# Patient Record
Sex: FEMALE
[De-identification: names, ages, dates, MRNs, and addresses within clinical notes are randomized per-mention and may not be internally consistent; named-entity substitution may affect disease eponyms.]

---

## 2017-10-30 ENCOUNTER — HOSPITAL (OUTPATIENT)
Dept: OTHER | Age: 32
End: 2017-10-30
Attending: EMERGENCY MEDICINE

## 2018-02-01 ENCOUNTER — OFFICE VISIT (OUTPATIENT)
Dept: FAMILY MEDICINE CLINIC | Facility: CLINIC | Age: 33
End: 2018-02-01

## 2018-02-01 VITALS
TEMPERATURE: 98 F | RESPIRATION RATE: 16 BRPM | BODY MASS INDEX: 29.99 KG/M2 | OXYGEN SATURATION: 99 % | SYSTOLIC BLOOD PRESSURE: 130 MMHG | HEIGHT: 65 IN | WEIGHT: 180 LBS | HEART RATE: 79 BPM | DIASTOLIC BLOOD PRESSURE: 72 MMHG

## 2018-02-01 DIAGNOSIS — L08.9 LOCAL SKIN INFECTION: Primary | ICD-10-CM

## 2018-02-01 PROCEDURE — 99202 OFFICE O/P NEW SF 15 MIN: CPT | Performed by: NURSE PRACTITIONER

## 2018-02-01 RX ORDER — CEPHALEXIN 500 MG/1
500 CAPSULE ORAL 2 TIMES DAILY
Qty: 14 CAPSULE | Refills: 0 | Status: SHIPPED | OUTPATIENT
Start: 2018-02-01 | End: 2018-04-26

## 2018-02-01 NOTE — PROGRESS NOTES
CHIEF COMPLAINT:   Patient presents with:  Skin: pt has a rash on left side of torso, c/o itching, has pus coming from area x 2 dys using neosporin      HPI:     Bradley Schultz is a 27year old female who presents with concerns of a skin infection un enlargement of the lymph nodes. MUSC/SKEL: no joint swelling, no joint stiffness. CARDIOVASCULAR: denies chest pain on exertion or rest.  GI: no nausea, no vomiting or abdominal pain  NEURO: no abnormal sensation, no tingling of the skin or numbness. ointment as prescribed 3 times daily  Apply band aid to area after mupirocin is applied   May shower, dry well and apply mupirocin / Band-Aid    Avoid picking or touching   If not improving in 3-5 days, follow-up with PCP.      If any fevers, red streaks, p

## 2018-02-01 NOTE — PATIENT INSTRUCTIONS
Cephalexin as prescribed   Mupirocin ointment as prescribed 3 times daily  Apply band aid to area after mupirocin is applied   May shower, dry well and apply mupirocin / Band-Aid    Avoid picking or touching   If not improving in 3-5 days, follow-up with [facility]

## 2018-04-26 ENCOUNTER — OFFICE VISIT (OUTPATIENT)
Dept: FAMILY MEDICINE CLINIC | Facility: CLINIC | Age: 33
End: 2018-04-26

## 2018-04-26 VITALS
OXYGEN SATURATION: 99 % | WEIGHT: 180 LBS | SYSTOLIC BLOOD PRESSURE: 112 MMHG | BODY MASS INDEX: 28.93 KG/M2 | HEIGHT: 66 IN | TEMPERATURE: 98 F | DIASTOLIC BLOOD PRESSURE: 74 MMHG | HEART RATE: 76 BPM

## 2018-04-26 DIAGNOSIS — L08.9 LOCAL SKIN INFECTION: ICD-10-CM

## 2018-04-26 PROCEDURE — 99213 OFFICE O/P EST LOW 20 MIN: CPT | Performed by: NURSE PRACTITIONER

## 2018-04-26 RX ORDER — CEPHALEXIN 500 MG/1
500 CAPSULE ORAL 2 TIMES DAILY
Qty: 14 CAPSULE | Refills: 0 | Status: SHIPPED | OUTPATIENT
Start: 2018-04-26 | End: 2018-05-03

## 2018-04-26 NOTE — PROGRESS NOTES
CHIEF COMPLAINT:   Patient presents with:  Skin: pt c/o irritation under left breast, c/o itching in area x 2 dys       HPI:     Trino Portillo is a 27year old female who presents with , Kathie Paz, with concerns of local skin infection to lef stiffness. CARDIOVASCULAR: denies chest pain on exertion or rest.  GI: no nausea, no vomiting or abdominal pain  NEURO: no abnormal sensation, no tingling of the skin or numbness.     EXAM:   /74   Pulse 76   Temp 98.2 °F (36.8 °C) (Oral)   Ht 66\"

## 2018-04-26 NOTE — PATIENT INSTRUCTIONS
Cephalexin as prescribed   Mupirocin ointment as prescribed 3 times daily  Apply band aid to area after mupirocin is applied   May shower, dry well and apply mupirocin / Band-Aid     Avoid picking or touching   If not improving in 3-5 days, follow-up with

## 2018-07-17 ENCOUNTER — HOSPITAL (OUTPATIENT)
Dept: OTHER | Age: 33
End: 2018-07-17
Attending: EMERGENCY MEDICINE

## 2018-07-23 ENCOUNTER — HOSPITAL (OUTPATIENT)
Dept: OTHER | Age: 33
End: 2018-07-23
Attending: EMERGENCY MEDICINE

## 2018-07-23 LAB
AMORPH SED URNS QL MICRO: ABNORMAL
APPEARANCE UR: CLEAR
BACTERIA #/AREA URNS HPF: ABNORMAL /HPF
BILIRUB UR QL: NEGATIVE
CAOX CRY URNS QL MICRO: ABNORMAL
CLUE CELLS SPEC QL WET PREP: NORMAL
COLOR UR: YELLOW
EPITH CASTS #/AREA URNS LPF: ABNORMAL /[LPF]
FATTY CASTS #/AREA URNS LPF: ABNORMAL /[LPF]
GLUCOSE UR-MCNC: NEGATIVE MG/DL
GRAN CASTS #/AREA URNS LPF: ABNORMAL /[LPF]
HGB UR QL: ABNORMAL
HYALINE CASTS #/AREA URNS LPF: ABNORMAL /LPF (ref 0–5)
KETONES UR-MCNC: NEGATIVE MG/DL
LEUKOCYTE ESTERASE UR QL STRIP: NEGATIVE
MIXED CELL CASTS #/AREA URNS LPF: ABNORMAL /[LPF]
MUCOUS THREADS URNS QL MICRO: PRESENT
NITRITE UR QL: NEGATIVE
PH UR: 6 UNIT (ref 5–7)
PROT UR QL: NEGATIVE MG/DL
RBC #/AREA URNS HPF: ABNORMAL /HPF (ref 0–3)
RBC CASTS #/AREA URNS LPF: ABNORMAL /[LPF]
RENAL EPI CELLS #/AREA URNS HPF: ABNORMAL /[HPF]
SP GR UR: 1.01 (ref 1–1.03)
SPECIMEN SOURCE: ABNORMAL
SPERM URNS QL MICRO: ABNORMAL
SQUAMOUS #/AREA URNS HPF: ABNORMAL /HPF (ref 0–5)
T VAGINALIS SPEC QL WET PREP: NORMAL
T VAGINALIS URNS QL MICRO: ABNORMAL
TRI-PHOS CRY URNS QL MICRO: ABNORMAL
URATE CRY URNS QL MICRO: ABNORMAL
URINE REFLEX: ABNORMAL
URNS CMNT MICRO: ABNORMAL
UROBILINOGEN UR QL: 0.2 MG/DL (ref 0–1)
WAXY CASTS #/AREA URNS LPF: ABNORMAL /[LPF]
WBC #/AREA URNS HPF: ABNORMAL /HPF (ref 0–5)
WBC CASTS #/AREA URNS LPF: ABNORMAL /[LPF]
YEAST HYPHAE URNS QL MICRO: ABNORMAL
YEAST SPEC QL WET PREP: NORMAL
YEAST URNS QL MICRO: ABNORMAL

## 2018-07-23 NOTE — ED INITIAL ASSESSMENT (HPI)
Patient to ED via EMS. Reports the patient has been agitated, screaming, and hitting her caregiver since around 4pm today.   Patient was seen earlier today at another ER, dx with a vaginal infection per caregiver

## 2018-07-24 NOTE — ED NOTES
Plan at this time to move pt to B2 with the possibility of needing to place restraints. RN remains at bedside with security and MIKI Silver, offering continued assistance to mother and pt. Pt continues to walk around room, refusing to sit on bed.

## 2018-07-24 NOTE — ED PROVIDER NOTES
Patient is resting comfortably. No issues as to this point my shift  She is medically stable for inpatient psychiatric treatment    The psychiatrist evaluated patient in the emergency department. He feels that patient may be safely discharged home.   He d

## 2018-07-24 NOTE — BH PROGRESS NOTE
This writer spoke with Dr. Keira Felder, who said he is aware that patient is still in the ED and he will see her today.

## 2018-07-24 NOTE — ED NOTES
7/24/18    (17:58 PM) Per [address] [place] [address]. Dr. Radha Pettit is dicharging Pt home. Notified ED Physician who was already aware of plan.

## 2018-07-24 NOTE — ED NOTES
Pt continues with agitation, staff remain at bedside. Mother continues to provide support to pt, despite pt continued attempts to strike at mother and bite self. Pt awake, skin w/d,resps reg/unlabored.

## 2018-07-24 NOTE — ED NOTES
Pt ambulated to room accompanied by Mom, nursing staff and security.  Pt continues to strike at her mother with open fist. Pt assisted onto cart by staff, plan to administer haldol per order and place restraints for pt, staff, and family safety at this time

## 2018-07-24 NOTE — ED NOTES
Pt noted to have some increase in moaning at this time. RN to room to check status, and pt noted to appear somewhat agitated, rocking on chair, mother providing support. Per mom, pt is wanting to eat her lunch.  [place] [facility] sandwich offered and pt and mother in

## 2018-07-24 NOTE — BH PROGRESS NOTE
[facility]: no beds  [place].  [facility]: no answer  [facility]: n obeds per [employer]: no beds  [facility]: KARE[facility] @ [address]  [facility] [address]: no beds per [employer]: Luis Carlos will return call

## 2018-07-24 NOTE — BH LEVEL OF CARE ASSESSMENT
Level of Care Assessment Note    General Questions  Why are you here?: Pt is diagnosed with Autism and mild mental retardation. Pt has hearing impairment in both ears and does not wear any hearing devices.  Pt is non verbal. Pt is unable to take part in the property, she attempted to break chairs by banging on them. Per pt's garegiver, pt does not understand the concept of suicidality. Pt has never attempted suicide   History of Present Illness: Pt is dxed with Autism with mild mental retardation.  Pt is non v concept of suicidality.  Pt has never attempted suicide   Family's Biggest Areas of Concern: Pt's aggressive behavior and agitation     Referral Source  Referral Source: Ruby 3: [facility]  Person/Contact Name: [facility]W ER     Suicide Risk  Sour SI)    Danger to Others/Property  Have you harmed someone or had thoughts about wanting someone harmed or killed in the past 30 days?: Yes (Pt is non verbal and unable to take part in the assessment.  Information is provide by pt's caregiver. )  Person(s) I caregiver     Self Injury  History of Self Injurious Behaviors: Yes  Date of Past Occurence: 07/20/18  Describe Past Self-Injurious Behaviors: Pt has hx of self injurious behavior, pt bite herself, scratch and picks her skin   Present Self-Injurious [employer] physical aggression and agitation )    Alcohol Use  How often do you have a drink containing alcohol? : Never       Illicit and Prescription Drug Use  Which if any illicit/prescription drugs have you used/abused?: Denies Volume: Loud;Yelling  Clarity: Other (comment) (Pt is non verbal)  Cognition  Concentration: Unimpaired  Memory: Unable to assess (Pt is non verbal and refusing to take part in assessment )  Orientation Level: Unable to assess (Pt is non verbal and currenl scratching, biting, hitting and biting staff members for the past one week. Pt has also engages in self injurous behavior, she picks at her skin, bites herself and scratches herself. Pt self in jured today by picking ehr skin.  Pt is destructive towards pro

## 2018-07-24 NOTE — BH PROGRESS NOTE
[facility]: no beds  [facility]: returned call, no beds  [facility]: per Kellie Matthews, no beds  a[facility]min at [facility]: sent page at [phone]

## 2018-07-24 NOTE — ED NOTES
Patient yelling and aggressive while in ER. Attempted to calm patient down, unable to calm down while in ER. Patient swinging and kicking at ER staff,   Haldol and Ativan given IM as ordered.   Patient remains in restraints, police and public safety remai

## 2018-07-24 NOTE — ED NOTES
No beds at [facility], [facility], [facility], [facility].

## 2018-07-24 NOTE — ED NOTES
Security at bedside, offering assistance to mother at this time. Mother refusing additional assistance, stating that she would like additional staff to step out of room at this time in order to talk with pt and attempt to calm her.  Staff remains outside of

## 2018-07-24 NOTE — BH PROGRESS NOTE
[address]: no answer  [address]: no bed per [facility]: [facility]: no beds  [facility]: [facility] [address] [place]: no bed per [facility]

## 2018-07-24 NOTE — ED NOTES
Pt banging on door. When open pt made sign to go \"home\" pt came out of room and walked around. Pt redirectable. Will call mom to see if family is coming.

## 2018-07-24 NOTE — ED NOTES
Spoke to Arturo Ortiz at [facility] will try to call a sitter to come to the ED to sit with pt.

## 2018-07-24 NOTE — ED NOTES
[facility]-unable to accomidate pt, [facility] beds, [place] ([place])-unable to accomidate pt

## 2018-07-24 NOTE — ED NOTES
Report received from [place], [facility]. Pt awake and alert to her norm, seated in chair, skin w/d,resps reg/unlabored. Pt smiling, interacting with mother and  at bedside. Pt coloring with crayons. Pt given water and lunch tray ordered at this time.  Pt c

## 2018-07-24 NOTE — ED NOTES
Concern at this time that oral medication will not calm pt's anxiety at this time. Concern for staff and mother safety at this time.  Dr. Kaylen Narayan made aware of pt's agitation and discussion had with João Godinez RN that pt will need to possibly go back into

## 2018-07-24 NOTE — ED NOTES
- LEFT [facility] FOR FOR [facility] AT [facility] REQUESTING C/B TO DISCUSS POTENTIAL BED AVAILABILITY [phone]. AWAITING CALL BACK.   - SPOKE [address] [phone]. FAXED INFO [phone].  AWAITING C/B TO DISCUSS BED AVAILABILIT

## 2018-07-24 NOTE — CONSULTS
Psychiatric Evaluation    Conception Almonte YOB: 1985   Age/Gender 26year old female MRN [id number]   Location [facility] Attending Sravani Collazo MD   Hosp Day # 0 PCP PHYSICIAN NONSTAFF     Date of Service: going on may have also affected patient's mood such as her father retiring, changes at the group home. Mom denies the patient has ever been suicidal that she has ever made any attempts.   She has not indicated to her that she is actively depressed nor ha difficulty attending to interview as evidenced by patient's ability to attend to our converstation  Memory:  unable to assess -as evidenced by patients ability to give a consistent history   Intellect: severely impaired  based on patients language ellie acute safety concerns and denied that she has ever made any suicidal statements, no intent or plans in the past.  Mom stated that if patient becomes behaviorally agitated at home that she will return to the emergency room.         Plan:    After thorough di

## 2018-07-24 NOTE — ED NOTES
[facility]-unable to accomidate pt,  [facility]-unable to accomidate pt, [facility]unable to [facility] pt, [facility]-no appropriate bed

## 2018-07-24 NOTE — ED NOTES
At this time, RN returned to room with turkey sandwich. Pt remains seated, but mom found holding pt's wrists. Mom states at this time, Sergio John is asking for the restraints\". Additional staff called to room for assistance.  Pt noted to be getting up in room,

## 2018-07-24 NOTE — ED PROVIDER NOTES
Patient Seen in: [facility] Emergency Department    History   Patient presents with:  Eval-P (psychiatric)    Stated Complaint: EVAL P    HPI    33-year-old with mild MR, seizure disorder, autism and chronic loss of hearing presents for evaluation o [07/23/18 1902]  Pulse: (!) 148 [07/23/18 1852]  Resp: (!) 40 [07/23/18 1852]  Temp: 98.7 °F (37.1 °C) [07/23/18 1902]  Temp src: Temporal [07/23/18 1902]  SpO2: 98 % [07/23/18 1852]  O2 Device: None (Room air) [07/23/18 1852]    Current:/75   Pulse patient's clinical status due to severe agitation.   The patient required my time at the bedside performing direct personal management, the absence of which would likely result in sudden, clinically significant or life threatening deterioration of  clinical

## 2018-07-24 NOTE — ED NOTES
Pt ambulated to and from bathroom with steady gait. Pt updated on plan. Pt redirectable and laying in bed. Breakfast tray ordered.

## 2018-07-24 NOTE — ED PROVIDER NOTES
The patient yelled out only one time during my shift wondering where her mother was. She was redirected by nursing staff and calm down right away. Otherwise she was resting comfortably.

## 2018-07-24 NOTE — ED NOTES
Pt assisted to restroom, pt changed into new gown in front and back. Pt tolerated po med, lights down and moved to B4 for pt safety and sleep. Pt caregiver to home. All pertinent information left here, including contact phone numbers. Awaiting placement.  A

## 2018-07-24 NOTE — ED NOTES
Pt more calm at this time. Pt is responsive to written and sign. Pt was assisted to use the commode, undressed into hospital gown and socks. Tolerated well. Pt returned to cart and given box lunch and Gatorade per request. Pt's caregiver at bedside.

## 2018-07-24 NOTE — ED NOTES
SPOKE WITH FILIBERTO AT [facility] [phone]- DUE TO \"EXCLUSIONARY\" CRITERIA, THEY ARE UNABLE TO TAKE PT AND HAVE DENIED BED REQUEST.

## 2018-07-24 NOTE — BH PROGRESS NOTE
No appropriate beds at: [facility], [facility], [address] [place] [address]. Left message with [facility].

## 2018-07-24 NOTE — ED NOTES
Pt awake and asking mom to eat lunch. Security called to remove restraints. Mother aware pt must not hit or bite or pt will have to be restrained.

## 2018-07-24 NOTE — ED NOTES
Claudeen Reiter from [facility] [place] unable to accomodate francheska GIBBS aware.

## 2018-07-24 NOTE — ED NOTES
Dr. Rabia Thompson at bedside. Parents remain at bedside. Dr. Rabia Thompson discussed with parents at this time the need to administer additional medication due to increased agitation at this time.

## 2018-07-24 NOTE — ED NOTES
Alyssa Weaver appropriate bed, [facility]-not able to accommodate pt

## 2018-07-24 NOTE — ED NOTES
L/M for [facility]    No beds at [facility] or [place], [address]

## 2018-07-24 NOTE — ED NOTES
Patient is remained stable throughout ER shift, patient's parents and  have arrived in the emergency room and patient is calm and cooperative. Currently awaiting placement to psychiatric facility.

## 2018-07-24 NOTE — ED NOTES
Spoke with [facility] to provide requested information. Clinical faxed to [facility] and [facility] for review.

## 2018-07-24 NOTE — ED NOTES
Dr. Ray Trejo was notified that the pt has not been placed and that he would need to see her in the ER. He would need to see her by 2am on 7/25/18. Voicemail was left at 1500 on 7/24/18.

## 2018-07-30 ENCOUNTER — HOSPITAL (OUTPATIENT)
Dept: OTHER | Age: 33
End: 2018-07-30
Attending: EMERGENCY MEDICINE

## 2018-07-30 LAB
ALBUMIN SERPL-MCNC: 3.8 GM/DL (ref 3.6–5.1)
ALBUMIN/GLOB SERPL: 1 {RATIO} (ref 1–2.4)
ALP SERPL-CCNC: 70 UNIT/L (ref 45–117)
ALT SERPL-CCNC: 22 UNIT/L
AMORPH SED URNS QL MICRO: ABNORMAL
AMPHETAMINES UR QL SCN>500 NG/ML: NEGATIVE
ANALYZER ANC (IANC): ABNORMAL
ANION GAP SERPL CALC-SCNC: 15 MMOL/L (ref 10–20)
APPEARANCE UR: CLEAR
AST SERPL-CCNC: 18 UNIT/L
BACTERIA #/AREA URNS HPF: ABNORMAL /HPF
BARBITURATES UR QL SCN>200 NG/ML: NEGATIVE
BASOPHILS # BLD: 0 THOUSAND/MCL (ref 0–0.3)
BASOPHILS NFR BLD: 0 %
BENZODIAZ UR QL SCN>200 NG/ML: NEGATIVE
BILIRUB SERPL-MCNC: 0.6 MG/DL (ref 0.2–1)
BILIRUB UR QL: NEGATIVE
BUN SERPL-MCNC: 7 MG/DL (ref 6–20)
BUN/CREAT SERPL: 11 (ref 7–25)
BZE UR QL SCN>150 NG/ML: NEGATIVE
CALCIUM SERPL-MCNC: 9.1 MG/DL (ref 8.4–10.2)
CANNABINOIDS UR QL SCN>50 NG/ML: NEGATIVE
CAOX CRY URNS QL MICRO: ABNORMAL
CHLORIDE: 105 MMOL/L (ref 98–107)
CO2 SERPL-SCNC: 22 MMOL/L (ref 21–32)
COLOR UR: YELLOW
CREAT SERPL-MCNC: 0.63 MG/DL (ref 0.51–0.95)
DIFFERENTIAL METHOD BLD: ABNORMAL
EOSINOPHIL # BLD: 0 THOUSAND/MCL (ref 0.1–0.5)
EOSINOPHIL NFR BLD: 0 %
EPITH CASTS #/AREA URNS LPF: ABNORMAL /[LPF]
ERYTHROCYTE [DISTWIDTH] IN BLOOD: 12.8 % (ref 11–15)
ETHANOL SERPL-MCNC: NORMAL MG/DL
FATTY CASTS #/AREA URNS LPF: ABNORMAL /[LPF]
GLOBULIN SER-MCNC: 3.8 GM/DL (ref 2–4)
GLUCOSE SERPL-MCNC: 106 MG/DL (ref 65–99)
GLUCOSE UR-MCNC: NEGATIVE MG/DL
GRAN CASTS #/AREA URNS LPF: ABNORMAL /[LPF]
HEMATOCRIT: 42.4 % (ref 36–46.5)
HGB BLD-MCNC: 15.3 GM/DL (ref 12–15.5)
HGB UR QL: ABNORMAL
HYALINE CASTS #/AREA URNS LPF: ABNORMAL /LPF (ref 0–5)
KETONES UR-MCNC: NEGATIVE MG/DL
LEUKOCYTE ESTERASE UR QL STRIP: ABNORMAL
LYMPHOCYTES # BLD: 1.3 THOUSAND/MCL (ref 1–4.8)
LYMPHOCYTES NFR BLD: 9 %
MCH RBC QN AUTO: 30.5 PG (ref 26–34)
MCHC RBC AUTO-ENTMCNC: 36.1 GM/DL (ref 32–36.5)
MCV RBC AUTO: 84.5 FL (ref 78–100)
MIXED CELL CASTS #/AREA URNS LPF: ABNORMAL /[LPF]
MONOCYTES # BLD: 0.8 THOUSAND/MCL (ref 0.3–0.9)
MONOCYTES NFR BLD: 5 %
MUCOUS THREADS URNS QL MICRO: ABNORMAL
NEUTROPHILS # BLD: 12.2 THOUSAND/MCL (ref 1.8–7.7)
NEUTROPHILS NFR BLD: 86 %
NEUTS SEG NFR BLD: ABNORMAL %
NITRITE UR QL: NEGATIVE
NRBC (NRBCRE): ABNORMAL
OPIATES UR QL SCN>300 NG/ML: NEGATIVE
PCP UR QL SCN>25 NG/ML: NEGATIVE
PH UR: 5 UNIT (ref 5–7)
PLATELET # BLD: 290 THOUSAND/MCL (ref 140–450)
POTASSIUM SERPL-SCNC: 3.7 MMOL/L (ref 3.4–5.1)
PROT SERPL-MCNC: 7.6 GM/DL (ref 6.4–8.2)
PROT UR QL: NEGATIVE MG/DL
RBC # BLD: 5.02 MILLION/MCL (ref 4–5.2)
RBC #/AREA URNS HPF: ABNORMAL /HPF (ref 0–3)
RBC CASTS #/AREA URNS LPF: ABNORMAL /[LPF]
RENAL EPI CELLS #/AREA URNS HPF: ABNORMAL /[HPF]
SODIUM SERPL-SCNC: 138 MMOL/L (ref 135–145)
SP GR UR: 1.02 (ref 1–1.03)
SPECIMEN SOURCE: ABNORMAL
SPERM URNS QL MICRO: ABNORMAL
SQUAMOUS #/AREA URNS HPF: ABNORMAL /HPF (ref 0–5)
T VAGINALIS URNS QL MICRO: ABNORMAL
TRI-PHOS CRY URNS QL MICRO: ABNORMAL
URATE CRY URNS QL MICRO: ABNORMAL
URINE REFLEX: ABNORMAL
URNS CMNT MICRO: ABNORMAL
UROBILINOGEN UR QL: 0.2 MG/DL (ref 0–1)
WAXY CASTS #/AREA URNS LPF: ABNORMAL /[LPF]
WBC # BLD: 14.3 THOUSAND/MCL (ref 4.2–11)
WBC #/AREA URNS HPF: ABNORMAL /HPF (ref 0–5)
WBC CASTS #/AREA URNS LPF: ABNORMAL /[LPF]
YEAST HYPHAE URNS QL MICRO: ABNORMAL
YEAST URNS QL MICRO: ABNORMAL

## 2018-08-01 ENCOUNTER — HOSPITAL (OUTPATIENT)
Dept: OTHER | Age: 33
End: 2018-08-01
Attending: PSYCHIATRY & NEUROLOGY

## 2018-08-02 LAB
CHOLEST SERPL-MCNC: 219 MG/DL
CHOLEST/HDLC SERPL: 5.8 {RATIO}
FREE T4: 1.2 NG/DL (ref 0.8–1.5)
GLYCOHEMOGLOBIN: 4.6 % (ref 4.5–5.6)
HDLC SERPL-MCNC: 38 MG/DL
LDLC SERPL CALC-MCNC: 153 MG/DL
NONHDLC SERPL-MCNC: 181 MG/DL
T3 SERPL-MCNC: 1.52 NG/ML (ref 0.6–1.81)
T4 SERPL-MCNC: 16.3 MCG/DL (ref 4.7–13.3)
TRIGLYCERIDE (TRIGP): 140 MG/DL
TSH SERPL-ACNC: 1.79 MCUNIT/ML (ref 0.35–5)

## 2018-08-09 ENCOUNTER — OFFICE VISIT (OUTPATIENT)
Dept: FAMILY MEDICINE CLINIC | Facility: CLINIC | Age: 33
End: 2018-08-09
Payer: MEDICAID

## 2018-08-09 VITALS
OXYGEN SATURATION: 100 % | SYSTOLIC BLOOD PRESSURE: 122 MMHG | WEIGHT: 180 LBS | HEART RATE: 85 BPM | DIASTOLIC BLOOD PRESSURE: 76 MMHG | TEMPERATURE: 98 F | BODY MASS INDEX: 28.93 KG/M2 | HEIGHT: 66 IN

## 2018-08-09 DIAGNOSIS — N30.01 ACUTE CYSTITIS WITH HEMATURIA: Primary | ICD-10-CM

## 2018-08-09 DIAGNOSIS — N94.9 VAGINAL DISCOMFORT: ICD-10-CM

## 2018-08-09 LAB
APPEARANCE: CLEAR
MULTISTIX LOT#: ABNORMAL NUMERIC
PH, URINE: 7 (ref 4.5–8)
SPECIFIC GRAVITY: <=1.005 (ref 1–1.03)
URINE-COLOR: YELLOW
UROBILINOGEN,SEMI-QN: 0.2 MG/DL (ref 0–1.9)

## 2018-08-09 PROCEDURE — 99213 OFFICE O/P EST LOW 20 MIN: CPT | Performed by: NURSE PRACTITIONER

## 2018-08-09 PROCEDURE — 87086 URINE CULTURE/COLONY COUNT: CPT | Performed by: NURSE PRACTITIONER

## 2018-08-09 PROCEDURE — 81003 URINALYSIS AUTO W/O SCOPE: CPT | Performed by: NURSE PRACTITIONER

## 2018-08-09 RX ORDER — FLUCONAZOLE 150 MG/1
TABLET ORAL
Qty: 3 TABLET | Refills: 0 | Status: SHIPPED | OUTPATIENT
Start: 2018-08-09

## 2018-08-09 RX ORDER — RISPERIDONE 2 MG/1
TABLET, FILM COATED ORAL
Refills: 0 | COMMUNITY
Start: 2018-08-07 | End: 2018-08-09 | Stop reason: ALTCHOICE

## 2018-08-09 RX ORDER — CIPROFLOXACIN 500 MG/1
500 TABLET, FILM COATED ORAL 2 TIMES DAILY
Qty: 14 TABLET | Refills: 0 | Status: SHIPPED | OUTPATIENT
Start: 2018-08-09 | End: 2018-08-16

## 2018-08-09 RX ORDER — CARBAMAZEPINE 200 MG/1
TABLET ORAL
Refills: 0 | COMMUNITY
Start: 2018-08-07 | End: 2021-04-27

## 2018-08-09 NOTE — PATIENT INSTRUCTIONS
Fluids   Urine culture sent out   Take diflucan day one and repeat in 72 hours  If vaginal/urine symptoms persist or worsen please follow up with PCP

## 2018-08-09 NOTE — PROGRESS NOTES
CHIEF COMPLAINT:   Patient presents with:  Urinary: dysuria x 1 dy had recent yeast infection and took antibiotics, was discharged from [facility] on Tuesday and had change of meds per       HPI:   Sotero Elliott is a 26year old female who p mental retardation    • Seizure disorder [facility])       Social History:  Smoking status: Never Smoker                                                              Smokeless tobacco: Never Used                            REVIEW OF SYSTEMS:   GENERAL: [place] fe antibiotic and diflucan     PLAN: Meds as listed below. Meds & Refills for this Visit:    Signed Prescriptions Disp Refills    Ciprofloxacin HCl 500 MG Oral Tab 14 tablet 0      Sig: Take 1 tablet (500 mg total) by mouth 2 (two) times daily.       fluc

## 2018-08-14 ENCOUNTER — TELEPHONE (OUTPATIENT)
Dept: FAMILY MEDICINE CLINIC | Facility: CLINIC | Age: 33
End: 2018-08-14

## 2018-08-14 NOTE — TELEPHONE ENCOUNTER
Patient's  called from group home for urine culture results. Results were negative. She will bring guardianship papers later today so she can have urine culture results in her chart at the group home.

## 2019-01-03 ENCOUNTER — HOSPITAL (OUTPATIENT)
Dept: OTHER | Age: 34
End: 2019-01-03
Attending: EMERGENCY MEDICINE

## 2019-02-23 ENCOUNTER — HOSPITAL (OUTPATIENT)
Dept: OTHER | Age: 34
End: 2019-02-23
Attending: EMERGENCY MEDICINE

## 2020-04-23 ENCOUNTER — ADVANCED DIRECTIVES (OUTPATIENT)
Dept: HEALTH INFORMATION MANAGEMENT | Age: 35
End: 2020-04-23

## 2020-06-13 NOTE — ED NOTES
Mother called [facility] to have discharge instructions faxed to ED for Dr. Roddy Dance to review. Mom states pt was seen with [facility] yesterday and was dx with vaginitis.  Per Dr. Fanta Golden, who is currently present okay to order difulcan 200 mg tab 57

## 2020-09-22 ENCOUNTER — LAB ENCOUNTER (OUTPATIENT)
Dept: LAB | Facility: HOSPITAL | Age: 35
End: 2020-09-22
Attending: Other
Payer: MEDICARE

## 2020-09-22 ENCOUNTER — OFFICE VISIT (OUTPATIENT)
Dept: NEUROLOGY | Facility: CLINIC | Age: 35
End: 2020-09-22
Payer: MEDICARE

## 2020-09-22 VITALS
HEART RATE: 70 BPM | WEIGHT: 165 LBS | RESPIRATION RATE: 16 BRPM | DIASTOLIC BLOOD PRESSURE: 70 MMHG | BODY MASS INDEX: 27 KG/M2 | SYSTOLIC BLOOD PRESSURE: 126 MMHG

## 2020-09-22 DIAGNOSIS — F69 BEHAVIOR PROBLEM, ADULT: ICD-10-CM

## 2020-09-22 DIAGNOSIS — G40.909 SEIZURE DISORDER (HCC): ICD-10-CM

## 2020-09-22 DIAGNOSIS — H91.93 HEARING DEFICIT, BILATERAL: ICD-10-CM

## 2020-09-22 DIAGNOSIS — G40.909 SEIZURE DISORDER (HCC): Primary | ICD-10-CM

## 2020-09-22 DIAGNOSIS — F84.0 AUTISM: ICD-10-CM

## 2020-09-22 DIAGNOSIS — G40.909 RECURRENT SEIZURES (HCC): ICD-10-CM

## 2020-09-22 LAB
ALBUMIN SERPL-MCNC: 3.8 G/DL (ref 3.4–5)
ALBUMIN/GLOB SERPL: 1.1 {RATIO} (ref 1–2)
ALP LIVER SERPL-CCNC: 74 U/L
ALT SERPL-CCNC: 22 U/L
ANION GAP SERPL CALC-SCNC: 3 MMOL/L (ref 0–18)
AST SERPL-CCNC: 9 U/L (ref 15–37)
BASOPHILS # BLD AUTO: 0.03 X10(3) UL (ref 0–0.2)
BASOPHILS NFR BLD AUTO: 0.4 %
BILIRUB SERPL-MCNC: 0.3 MG/DL (ref 0.1–2)
BUN BLD-MCNC: 6 MG/DL (ref 7–18)
BUN/CREAT SERPL: 8.8 (ref 10–20)
CALCIUM BLD-MCNC: 8.8 MG/DL (ref 8.5–10.1)
CARBAMAZEPINE SERPL-MCNC: 7.2 UG/ML (ref 4–12)
CHLORIDE SERPL-SCNC: 107 MMOL/L (ref 98–112)
CO2 SERPL-SCNC: 28 MMOL/L (ref 21–32)
CREAT BLD-MCNC: 0.68 MG/DL
DEPRECATED RDW RBC AUTO: 37.2 FL (ref 35.1–46.3)
EOSINOPHIL # BLD AUTO: 0.02 X10(3) UL (ref 0–0.7)
EOSINOPHIL NFR BLD AUTO: 0.3 %
ERYTHROCYTE [DISTWIDTH] IN BLOOD BY AUTOMATED COUNT: 12 % (ref 11–15)
GLOBULIN PLAS-MCNC: 3.5 G/DL (ref 2.8–4.4)
GLUCOSE BLD-MCNC: 89 MG/DL (ref 70–99)
HCT VFR BLD AUTO: 40.8 %
HGB BLD-MCNC: 14.2 G/DL
IMM GRANULOCYTES # BLD AUTO: 0.03 X10(3) UL (ref 0–1)
IMM GRANULOCYTES NFR BLD: 0.4 %
LYMPHOCYTES # BLD AUTO: 1.52 X10(3) UL (ref 1–4)
LYMPHOCYTES NFR BLD AUTO: 20.4 %
M PROTEIN MFR SERPL ELPH: 7.3 G/DL (ref 6.4–8.2)
MCH RBC QN AUTO: 29.7 PG (ref 26–34)
MCHC RBC AUTO-ENTMCNC: 34.8 G/DL (ref 31–37)
MCV RBC AUTO: 85.4 FL
MONOCYTES # BLD AUTO: 0.71 X10(3) UL (ref 0.1–1)
MONOCYTES NFR BLD AUTO: 9.5 %
NEUTROPHILS # BLD AUTO: 5.13 X10 (3) UL (ref 1.5–7.7)
NEUTROPHILS # BLD AUTO: 5.13 X10(3) UL (ref 1.5–7.7)
NEUTROPHILS NFR BLD AUTO: 69 %
OSMOLALITY SERPL CALC.SUM OF ELEC: 283 MOSM/KG (ref 275–295)
PATIENT FASTING Y/N/NP: NO
PLATELET # BLD AUTO: 266 10(3)UL (ref 150–450)
POTASSIUM SERPL-SCNC: 3.7 MMOL/L (ref 3.5–5.1)
RBC # BLD AUTO: 4.78 X10(6)UL
SODIUM SERPL-SCNC: 138 MMOL/L (ref 136–145)
WBC # BLD AUTO: 7.4 X10(3) UL (ref 4–11)

## 2020-09-22 PROCEDURE — 80053 COMPREHEN METABOLIC PANEL: CPT

## 2020-09-22 PROCEDURE — 85025 COMPLETE CBC W/AUTO DIFF WBC: CPT

## 2020-09-22 PROCEDURE — 36415 COLL VENOUS BLD VENIPUNCTURE: CPT

## 2020-09-22 PROCEDURE — 80156 ASSAY CARBAMAZEPINE TOTAL: CPT

## 2020-09-22 PROCEDURE — 99204 OFFICE O/P NEW MOD 45 MIN: CPT | Performed by: OTHER

## 2020-09-22 RX ORDER — MELATONIN
1000 DAILY
COMMUNITY

## 2020-09-22 RX ORDER — ERGOCALCIFEROL 1.25 MG/1
CAPSULE ORAL
COMMUNITY

## 2020-09-22 RX ORDER — IBUPROFEN 400 MG/1
400 TABLET ORAL AS NEEDED
COMMUNITY

## 2020-09-22 NOTE — PROGRESS NOTES
Patient had a seizure on 09/01/2020. Patient had dizziness on this day. Patient now denies headaches or dizziness. Denies vision changes or facial numbness or tingling. Patient is grinding her teeth more.

## 2020-09-22 NOTE — PROGRESS NOTES
[facility] [address]   Neurology; INITIAL CLINIC VISIT  2020, 2:10 PM     Gloria Philippe Patient Status:  No patient class for patient encounter    1985 MRN [id number]   Location ED [facility], [facility]  use.    ALLERGIES:  No Known Allergies    MEDICATIONS:  Current Outpatient Medications   Medication Sig Dispense Refill   • Vitamin B-12 1000 MCG Oral Tab Take 1,000 mcg by mouth daily.      • ergocalciferol 1.25 MG (86613 UT) Oral Cap Take by mouth every 7 denies bruising or excessive bleeding  ENDOCRINE: denies excessive thirst or urination; denies unexpected wt gain or wt loss  ALLERGY/IMM.: denies food or seasonal allergies      PHYSICAL EXAMINATION:  VITAL SIGNS: /70   Pulse 70   Resp 16   Wt 165 l bilateral    Recurrent seizures (HCC)    Seizure disorder (HCC) - Primary    Relevant Orders    EEG    CARBAMAZEPINE (TEGRETOL) TOTAL (Completed)    CBC WITH DIFFERENTIAL WITH PLATELET (Completed)    COMP METABOLIC PANEL (14) (Completed)          [employer]

## 2020-09-25 ENCOUNTER — TELEPHONE (OUTPATIENT)
Dept: NEUROLOGY | Facility: CLINIC | Age: 35
End: 2020-09-25

## 2020-09-25 NOTE — TELEPHONE ENCOUNTER
Attempted to reach patient to relay lab results but no answer and no VM.       ----- Message from Alan Sandoval MD sent at 9/23/2020  1:23 PM CDT -----  Labs normal

## 2020-09-29 NOTE — TELEPHONE ENCOUNTER
LM on confidential VM for Daxa Kraus at [address] [place] [address] indicating normal lab results.

## 2020-10-07 ENCOUNTER — NURSE ONLY (OUTPATIENT)
Dept: ELECTROPHYSIOLOGY | Facility: HOSPITAL | Age: 35
End: 2020-10-07
Attending: Other
Payer: MEDICARE

## 2020-10-07 DIAGNOSIS — G40.909 SEIZURE DISORDER (HCC): ICD-10-CM

## 2020-10-07 PROCEDURE — 95819 EEG AWAKE AND ASLEEP: CPT | Performed by: OTHER

## 2020-10-08 ENCOUNTER — TELEPHONE (OUTPATIENT)
Dept: NEUROLOGY | Facility: CLINIC | Age: 35
End: 2020-10-08

## 2020-10-08 NOTE — TELEPHONE ENCOUNTER
Relayed below information to Juanita De Luna RN (Okay per HIPAA). She verbalized understanding.        ----- Message from Warner Land MD sent at 10/8/2020  8:29 AM CDT -----  EEG did not show active seizure, limited due to artifact.

## 2020-10-08 NOTE — PROCEDURES
[facility] [address]  [facility] [address] [address]      PATIENT'S NAME: Guilherme Segovia   ATTENDING PHYSICIAN: Ty Caldwell M.D.    PATIENT ACCOUNT #: [de-identified] LOCATION: [facility]   MEDICAL RECORD #: [id number] DATE OF BIRTH: [birth year] correlation is indicated.     Dictated By Nunu Grullon M.D.  d: 10/07/2020 14:39:20  t: 10/07/2020 15:33:42  [facility] [id number]  NADIA/

## 2021-04-09 ENCOUNTER — TELEPHONE (OUTPATIENT)
Dept: NEUROLOGY | Facility: CLINIC | Age: 36
End: 2021-04-09

## 2021-04-09 NOTE — TELEPHONE ENCOUNTER
rec'd lab results from [facility] dated 4/8/21; placed in provider folder for review

## 2021-04-27 ENCOUNTER — TELEPHONE (OUTPATIENT)
Dept: NEUROLOGY | Facility: CLINIC | Age: 36
End: 2021-04-27

## 2021-04-27 ENCOUNTER — OFFICE VISIT (OUTPATIENT)
Dept: NEUROLOGY | Facility: CLINIC | Age: 36
End: 2021-04-27
Payer: MEDICARE

## 2021-04-27 VITALS
SYSTOLIC BLOOD PRESSURE: 124 MMHG | BODY MASS INDEX: 27 KG/M2 | WEIGHT: 165 LBS | HEART RATE: 64 BPM | DIASTOLIC BLOOD PRESSURE: 76 MMHG

## 2021-04-27 DIAGNOSIS — N30.01 ACUTE CYSTITIS WITH HEMATURIA: ICD-10-CM

## 2021-04-27 DIAGNOSIS — G40.909 SEIZURE DISORDER (HCC): Primary | ICD-10-CM

## 2021-04-27 DIAGNOSIS — G40.909 RECURRENT SEIZURES (HCC): ICD-10-CM

## 2021-04-27 DIAGNOSIS — F84.0 AUTISM: ICD-10-CM

## 2021-04-27 DIAGNOSIS — F69 BEHAVIOR PROBLEM, ADULT: ICD-10-CM

## 2021-04-27 DIAGNOSIS — H91.93 HEARING DEFICIT, BILATERAL: ICD-10-CM

## 2021-04-27 PROCEDURE — 99214 OFFICE O/P EST MOD 30 MIN: CPT | Performed by: OTHER

## 2021-04-27 RX ORDER — CARBAMAZEPINE 200 MG/1
TABLET ORAL
Qty: 270 TABLET | Refills: 3 | Status: SHIPPED | OUTPATIENT
Start: 2021-04-27 | End: 2021-04-28

## 2021-04-27 NOTE — TELEPHONE ENCOUNTER
Rx Carbamazepine faxed to [address] [place] [address],[address] with fax confirmation received.

## 2021-04-27 NOTE — PROGRESS NOTES
[facility]   Neurology; follow up  CLINIC VISIT  2021    Stanton Baez Patient Status:  No patient class for patient encounter    1985 MRN [id number]   [facility], [facility] history. FAMILY HISTORY:  family history is not on file. SOCIAL HISTORY:   reports that she has never smoked. She has never used smokeless tobacco. She reports previous alcohol use. She reports previous drug use.     ALLERGIES:  No Known Allergies frequency; no urinary incontinence  MUSCULOSKELETAL: no joint complaints in  upper or lower extremities  NEURO: see HPI;  PSYCHE: no symptoms of depression or anxiety  HEMATOLOGY:  denies bruising or excessive bleeding  ENDOCRINE: denies excessive thirst o Normal FTN and HTS;   Gait: Normal based,  Romberg sign is negative, Tandem walk is normal        LABS:     Reviewed with patient  EEG  IMPRESSION:  This is very limited study due to facial muscle grinding artifact since patient did not follow commands and ample opportunity to ask questions. All questions and concerns were addressed to satisfactory status. The patient was instructed to go to local ER if current symptoms worse or significant new symptoms arise. They understood my instruction.      Asuncion Yuan

## 2021-04-27 NOTE — TELEPHONE ENCOUNTER
During rooming process, pt is unable to communicate with writer d/t reported hearing deficit and cognitive impairment. Pt transporter unable to answer many of the questions.   Called [facility] and spoke to Michael, coordinator, who reports that there h

## 2021-04-28 RX ORDER — CARBAMAZEPINE 200 MG/1
200 TABLET ORAL 2 TIMES DAILY
Qty: 180 TABLET | Refills: 3 | COMMUNITY
Start: 2021-04-28

## 2021-04-28 NOTE — TELEPHONE ENCOUNTER
Spoke with Soila Churchill pharmacist, who states patient's caregiver told him patient has always been on carbamazepine 200mg bid but Dr Tiago Ybarra ordered it tid.    Per Dr Tiago Ybarra notes, patient is to continue current Carbamazepine dose- last prescribed on 12/30

## 2021-04-28 NOTE — TELEPHONE ENCOUNTER
Yesterday whoever roomed the patient, on chart of medication section it was tid, so I carried over.    No problem to do 200 mg bid  Will addendum to the chart

## 2021-11-08 ENCOUNTER — TELEPHONE (OUTPATIENT)
Dept: FAMILY MEDICINE CLINIC | Facility: CLINIC | Age: 36
End: 2021-11-08

## 2021-11-08 ENCOUNTER — OFFICE VISIT (OUTPATIENT)
Dept: FAMILY MEDICINE CLINIC | Facility: CLINIC | Age: 36
End: 2021-11-08
Payer: MEDICARE

## 2021-11-08 VITALS
DIASTOLIC BLOOD PRESSURE: 74 MMHG | TEMPERATURE: 98 F | HEIGHT: 65 IN | RESPIRATION RATE: 20 BRPM | HEART RATE: 68 BPM | BODY MASS INDEX: 24.12 KG/M2 | WEIGHT: 144.81 LBS | SYSTOLIC BLOOD PRESSURE: 112 MMHG

## 2021-11-08 DIAGNOSIS — Z00.00 ENCOUNTER FOR ANNUAL HEALTH EXAMINATION: Primary | ICD-10-CM

## 2021-11-08 DIAGNOSIS — Z13.31 DEPRESSION SCREENING: ICD-10-CM

## 2021-11-08 PROBLEM — E55.9 VITAMIN D DEFICIENCY: Status: ACTIVE | Noted: 2021-11-08

## 2021-11-08 PROBLEM — E78.00 PURE HYPERCHOLESTEROLEMIA: Status: ACTIVE | Noted: 2021-11-08

## 2021-11-08 PROCEDURE — G0444 DEPRESSION SCREEN ANNUAL: HCPCS | Performed by: FAMILY MEDICINE

## 2021-11-08 PROCEDURE — G0439 PPPS, SUBSEQ VISIT: HCPCS | Performed by: FAMILY MEDICINE

## 2021-11-08 RX ORDER — NORETHINDRONE AND ETHINYL ESTRADIOL 1 MG-35MCG
KIT ORAL
COMMUNITY
Start: 2021-10-28

## 2021-11-08 RX ORDER — MELOXICAM 15 MG/1
TABLET ORAL
COMMUNITY
Start: 2021-10-13

## 2021-11-08 RX ORDER — ESCITALOPRAM OXALATE 20 MG/1
TABLET ORAL
COMMUNITY
Start: 2021-10-13

## 2021-11-08 NOTE — PROGRESS NOTES
HPI:   Tia Vega is a 43year old female who presents for a Medicare Subsequent Annual Wellness visit (Pt already had Initial Annual Wellness). New patient to the office. Currently resides at [facility].  Presents with a care giver today and forms available to patient in AVS     She does NOT have a Power of  for [employer] on file in [address] [place] [address] Rd.    Advance care planning including the explanation and discussion of advance directives standard forms performed Face to Face with patient and F MG Oral Tab, Take 400 mg by mouth as needed. risperiDONE (RISPERDAL) 0.5 MG Oral Tab, Take 1 tablet (0.5 mg total) by mouth every 6 (six) hours as needed.  (Patient taking differently: Take 1 mg by mouth every 6 (six) hours as needed.)  Etodolac 400 MG Ora and external ear canals, both ears   Nose: Nares normal, septum midline,mucosa normal, no drainage or sinus tenderness   Throat: Lips, mucosa, and tongue normal; teeth and gums normal   Neck: Supple, symmetrical, trachea midline, no adenopathy;  thyroid: n than 10 pounds without trying?: 2 - No  Has your appetite been poor?: No  How does the patient maintain a good energy level?: Daily Walks  How would you describe your daily physical activity?: Light  How would you describe your current health state?: Good Density Screening    Bone density screening    Covered every 2 years after age 72 if diagnosed with risk of osteoporosis or estrogen deficiency.     Covered yearly for long-term glucocorticoid medication use (Steroids) No results found for this or any previ

## 2021-11-08 NOTE — PATIENT INSTRUCTIONS
Sejal Benitez's SCREENING SCHEDULE   Tests on this list are recommended by your physician but may not be covered, or covered at this frequency, by your insurer. Please check with your insurance carrier before scheduling to verify coverage.    PRE normal risk;  Annually if at high risk -  Pap Smear,3 Years Never done    Chlamydia Annually if high risk -  No recommendations at this time   Screening Mammogram    Mammogram     Recommend annually for all female patients aged 36 and older    One baseline Advance Directives. It also has the State forms available on it's website for anyone to review and print using their home computer and printer. (the forms are also available in [address])  www. [email or web address]. org  This link also has information from the [facility]

## 2021-11-08 NOTE — TELEPHONE ENCOUNTER
Can we contact [address]'s residence [address] [facility] and get some more information?  -Does patient utilize sign language or need an ? If so, can we make sure that for her next appt we have someone to interpret?      The care giver today did n

## 2021-12-20 NOTE — TELEPHONE ENCOUNTER
Ariela Gonzalez from [facility] returned call. She states patient would benefit from a  at [address] [facility]. Chart updated. Supervisor notified about next appt.     Future Appointments   Date Time Provider Daniel Stevens   5/9/2022  9:00 AM Muriel

## 2022-03-02 ENCOUNTER — TELEPHONE (OUTPATIENT)
Dept: SURGERY | Facility: CLINIC | Age: 37
End: 2022-03-02

## 2022-03-02 RX ORDER — CARBAMAZEPINE 200 MG/1
200 TABLET ORAL 3 TIMES DAILY
Qty: 270 TABLET | Refills: 0 | Status: SHIPPED | OUTPATIENT
Start: 2022-03-02 | End: 2022-03-03

## 2022-03-02 NOTE — TELEPHONE ENCOUNTER
Rx Carbamazepine 200mg #270, take 1 tablet [facility]. Nataly Nelson at "[employer].

## 2022-03-02 NOTE — TELEPHONE ENCOUNTER
Gemma Sexton from [address] [place] [address] calling in regards to patient, states she has had \"a few seizures\" today and hasn't had any in a few years. Patient has also been scheduled for f/u for tomorrow 3/3/22 with .

## 2022-03-03 ENCOUNTER — OFFICE VISIT (OUTPATIENT)
Dept: NEUROLOGY | Facility: CLINIC | Age: 37
End: 2022-03-03
Payer: MEDICARE

## 2022-03-03 VITALS
HEIGHT: 65 IN | BODY MASS INDEX: 22.82 KG/M2 | RESPIRATION RATE: 18 BRPM | HEART RATE: 64 BPM | DIASTOLIC BLOOD PRESSURE: 80 MMHG | WEIGHT: 137 LBS | SYSTOLIC BLOOD PRESSURE: 110 MMHG

## 2022-03-03 DIAGNOSIS — N30.01 ACUTE CYSTITIS WITH HEMATURIA: ICD-10-CM

## 2022-03-03 DIAGNOSIS — G40.909 SEIZURE DISORDER (HCC): Primary | ICD-10-CM

## 2022-03-03 DIAGNOSIS — G40.909 RECURRENT SEIZURES (HCC): ICD-10-CM

## 2022-03-03 DIAGNOSIS — F84.0 AUTISM: ICD-10-CM

## 2022-03-03 PROCEDURE — 99214 OFFICE O/P EST MOD 30 MIN: CPT | Performed by: OTHER

## 2022-03-03 RX ORDER — CARBAMAZEPINE 200 MG/1
200 TABLET ORAL 3 TIMES DAILY
Qty: 270 TABLET | Refills: 3 | Status: SHIPPED | OUTPATIENT
Start: 2022-03-03

## 2022-03-03 NOTE — PROGRESS NOTES
LOV 4/27/21 Seizure f/u- Patient present today with caregiver. Caregiver stated patient had 1 seizure since LOV recently. Patient did not miss medication.

## 2022-03-16 ENCOUNTER — LAB ENCOUNTER (OUTPATIENT)
Dept: LAB | Age: 37
End: 2022-03-16
Attending: Other
Payer: MEDICARE

## 2022-03-16 DIAGNOSIS — G40.909 SEIZURE DISORDER (HCC): ICD-10-CM

## 2022-03-16 LAB
ALBUMIN SERPL-MCNC: 4.3 G/DL (ref 3.4–5)
ALBUMIN/GLOB SERPL: 1.6 {RATIO} (ref 1–2)
ALP LIVER SERPL-CCNC: 66 U/L
ALT SERPL-CCNC: 22 U/L
ANION GAP SERPL CALC-SCNC: 6 MMOL/L (ref 0–18)
AST SERPL-CCNC: 11 U/L (ref 15–37)
BASOPHILS # BLD AUTO: 0.02 X10(3) UL (ref 0–0.2)
BASOPHILS NFR BLD AUTO: 0.4 %
BILIRUB SERPL-MCNC: 0.3 MG/DL (ref 0.1–2)
BUN BLD-MCNC: 4 MG/DL (ref 7–18)
CALCIUM BLD-MCNC: 9.4 MG/DL (ref 8.5–10.1)
CARBAMAZEPINE SERPL-MCNC: 6.6 UG/ML (ref 4–12)
CHLORIDE SERPL-SCNC: 102 MMOL/L (ref 98–112)
CO2 SERPL-SCNC: 27 MMOL/L (ref 21–32)
CREAT BLD-MCNC: 0.62 MG/DL
EOSINOPHIL # BLD AUTO: 0 X10(3) UL (ref 0–0.7)
EOSINOPHIL NFR BLD AUTO: 0 %
ERYTHROCYTE [DISTWIDTH] IN BLOOD BY AUTOMATED COUNT: 12.1 %
FASTING STATUS PATIENT QL REPORTED: YES
GLOBULIN PLAS-MCNC: 2.7 G/DL (ref 2.8–4.4)
GLUCOSE BLD-MCNC: 84 MG/DL (ref 70–99)
HCT VFR BLD AUTO: 40.2 %
HGB BLD-MCNC: 14.6 G/DL
IMM GRANULOCYTES # BLD AUTO: 0.01 X10(3) UL (ref 0–1)
IMM GRANULOCYTES NFR BLD: 0.2 %
LYMPHOCYTES # BLD AUTO: 0.67 X10(3) UL (ref 1–4)
LYMPHOCYTES NFR BLD AUTO: 14.8 %
MCH RBC QN AUTO: 30.3 PG (ref 26–34)
MCHC RBC AUTO-ENTMCNC: 36.3 G/DL (ref 31–37)
MCV RBC AUTO: 83.4 FL
MONOCYTES # BLD AUTO: 0.42 X10(3) UL (ref 0.1–1)
MONOCYTES NFR BLD AUTO: 9.3 %
NEUTROPHILS # BLD AUTO: 3.4 X10 (3) UL (ref 1.5–7.7)
NEUTROPHILS # BLD AUTO: 3.4 X10(3) UL (ref 1.5–7.7)
NEUTROPHILS NFR BLD AUTO: 75.3 %
OSMOLALITY SERPL CALC.SUM OF ELEC: 276 MOSM/KG (ref 275–295)
PLATELET # BLD AUTO: 268 10(3)UL (ref 150–450)
POTASSIUM SERPL-SCNC: 4.5 MMOL/L (ref 3.5–5.1)
PROT SERPL-MCNC: 7 G/DL (ref 6.4–8.2)
RBC # BLD AUTO: 4.82 X10(6)UL
SODIUM SERPL-SCNC: 135 MMOL/L (ref 136–145)
WBC # BLD AUTO: 4.5 X10(3) UL (ref 4–11)

## 2022-03-16 PROCEDURE — 80156 ASSAY CARBAMAZEPINE TOTAL: CPT

## 2022-03-16 PROCEDURE — 80053 COMPREHEN METABOLIC PANEL: CPT

## 2022-03-16 PROCEDURE — 85025 COMPLETE CBC W/AUTO DIFF WBC: CPT

## 2022-04-29 RX ORDER — CARBAMAZEPINE 200 MG/1
TABLET ORAL
Qty: 93 TABLET | Refills: 11 | OUTPATIENT
Start: 2022-04-29

## 2022-05-02 ENCOUNTER — TELEPHONE (OUTPATIENT)
Dept: FAMILY MEDICINE CLINIC | Facility: CLINIC | Age: 37
End: 2022-05-02

## 2022-05-02 NOTE — TELEPHONE ENCOUNTER
Received fax from [address] [place] [address] with patient's test results. Patient has upcoming appointment 05/09/22.  Fax placed in Dr. Enedina Jackson in box

## 2022-05-09 ENCOUNTER — OFFICE VISIT (OUTPATIENT)
Dept: FAMILY MEDICINE CLINIC | Facility: CLINIC | Age: 37
End: 2022-05-09
Payer: MEDICARE

## 2022-05-09 VITALS
HEIGHT: 65 IN | OXYGEN SATURATION: 100 % | HEART RATE: 60 BPM | RESPIRATION RATE: 16 BRPM | DIASTOLIC BLOOD PRESSURE: 70 MMHG | TEMPERATURE: 97 F | SYSTOLIC BLOOD PRESSURE: 110 MMHG | BODY MASS INDEX: 22.66 KG/M2 | WEIGHT: 136 LBS

## 2022-05-09 DIAGNOSIS — F84.0 AUTISM: ICD-10-CM

## 2022-05-09 DIAGNOSIS — H91.93 HEARING DEFICIT, BILATERAL: ICD-10-CM

## 2022-05-09 DIAGNOSIS — G40.909 SEIZURE DISORDER (HCC): ICD-10-CM

## 2022-05-09 DIAGNOSIS — E78.00 PURE HYPERCHOLESTEROLEMIA: Primary | ICD-10-CM

## 2022-05-09 PROCEDURE — 99214 OFFICE O/P EST MOD 30 MIN: CPT | Performed by: FAMILY MEDICINE

## 2022-05-09 RX ORDER — RISPERIDONE 1 MG/1
TABLET, FILM COATED ORAL
COMMUNITY
Start: 2022-04-12

## 2022-08-02 ENCOUNTER — TELEPHONE (OUTPATIENT)
Dept: FAMILY MEDICINE CLINIC | Facility: CLINIC | Age: 37
End: 2022-08-02

## 2022-11-10 ENCOUNTER — OFFICE VISIT (OUTPATIENT)
Dept: FAMILY MEDICINE CLINIC | Facility: CLINIC | Age: 37
End: 2022-11-10
Payer: MEDICARE

## 2022-11-10 VITALS
TEMPERATURE: 98 F | SYSTOLIC BLOOD PRESSURE: 110 MMHG | HEART RATE: 68 BPM | HEIGHT: 65 IN | RESPIRATION RATE: 16 BRPM | OXYGEN SATURATION: 98 % | WEIGHT: 130 LBS | DIASTOLIC BLOOD PRESSURE: 60 MMHG | BODY MASS INDEX: 21.66 KG/M2

## 2022-11-10 DIAGNOSIS — G40.909 SEIZURE DISORDER (HCC): ICD-10-CM

## 2022-11-10 DIAGNOSIS — Z00.00 ENCOUNTER FOR ANNUAL HEALTH EXAMINATION: Primary | ICD-10-CM

## 2022-11-10 DIAGNOSIS — F84.0 AUTISM: ICD-10-CM

## 2022-11-10 DIAGNOSIS — E78.00 PURE HYPERCHOLESTEROLEMIA: ICD-10-CM

## 2022-11-10 PROCEDURE — G0439 PPPS, SUBSEQ VISIT: HCPCS | Performed by: FAMILY MEDICINE

## 2022-11-10 RX ORDER — MELOXICAM 15 MG/1
TABLET ORAL
Refills: 0 | Status: CANCELLED | OUTPATIENT
Start: 2022-11-10

## 2022-11-10 RX ORDER — NORETHINDRONE-ETHIN. ESTRADIOL 1 MG-35MCG
TABLET ORAL
Refills: 0 | Status: CANCELLED | OUTPATIENT
Start: 2022-11-10 | End: 2023-11-10

## 2022-11-10 RX ORDER — FLUTICASONE PROPIONATE 50 MCG
1 SPRAY, SUSPENSION (ML) NASAL DAILY
Refills: 0 | Status: CANCELLED | OUTPATIENT
Start: 2022-11-10

## 2022-11-10 RX ORDER — MELATONIN
1000 DAILY
Refills: 0 | Status: CANCELLED | OUTPATIENT
Start: 2022-11-10

## 2022-11-10 RX ORDER — CETIRIZINE HYDROCHLORIDE 10 MG/1
10 TABLET ORAL DAILY
Refills: 0 | Status: CANCELLED | OUTPATIENT
Start: 2022-11-10

## 2022-11-15 ENCOUNTER — TELEPHONE (OUTPATIENT)
Dept: FAMILY MEDICINE CLINIC | Facility: CLINIC | Age: 37
End: 2022-11-15

## 2022-11-15 DIAGNOSIS — G40.909 NONINTRACTABLE EPILEPSY WITHOUT STATUS EPILEPTICUS, UNSPECIFIED EPILEPSY TYPE (HCC): Primary | ICD-10-CM

## 2022-11-15 DIAGNOSIS — F69 BEHAVIOR PROBLEM, ADULT: ICD-10-CM

## 2022-11-15 DIAGNOSIS — F84.0 AUTISTIC DISORDER, RESIDUAL STATE: ICD-10-CM

## 2022-11-15 NOTE — TELEPHONE ENCOUNTER
Vinny Enins- can we place an order for OT? I have printed the letter requested.   Marlene Gutierrez, DO

## 2022-11-18 NOTE — TELEPHONE ENCOUNTER
Referral request therapy    [facility]  Fax number: [phone]  Occupational therapy  Evaluate and treat  52 hours of service    G40.909  F84.0  F69

## 2022-12-07 ENCOUNTER — OFFICE VISIT (OUTPATIENT)
Dept: NEUROLOGY | Facility: CLINIC | Age: 37
End: 2022-12-07
Payer: MEDICARE

## 2022-12-07 VITALS
HEART RATE: 84 BPM | DIASTOLIC BLOOD PRESSURE: 68 MMHG | BODY MASS INDEX: 22 KG/M2 | SYSTOLIC BLOOD PRESSURE: 118 MMHG | RESPIRATION RATE: 16 BRPM | WEIGHT: 132 LBS

## 2022-12-07 DIAGNOSIS — G40.909 RECURRENT SEIZURES (HCC): ICD-10-CM

## 2022-12-07 PROCEDURE — 99214 OFFICE O/P EST MOD 30 MIN: CPT | Performed by: OTHER

## 2022-12-07 NOTE — PROGRESS NOTES
Patient denies recent seizures. Patient denies recent changes in memory. Denies HA. Office has attempted several time to connect to a , however connection is weak or lost. Communicated with the patient by writing on a sheet of paper.

## 2022-12-09 ENCOUNTER — TELEPHONE (OUTPATIENT)
Dept: NEUROLOGY | Facility: CLINIC | Age: 37
End: 2022-12-09

## 2022-12-09 NOTE — TELEPHONE ENCOUNTER
12/7/22 OV notes faxed to [facility] per provider request with fax confirmation received.

## 2022-12-12 ENCOUNTER — PATIENT MESSAGE (OUTPATIENT)
Dept: FAMILY MEDICINE CLINIC | Facility: CLINIC | Age: 37
End: 2022-12-12

## 2023-03-16 ENCOUNTER — LAB REQUISITION (OUTPATIENT)
Dept: LAB | Age: 38
End: 2023-03-16

## 2023-03-16 DIAGNOSIS — Z13.9 ENCOUNTER FOR SCREENING, UNSPECIFIED: ICD-10-CM

## 2023-03-16 LAB
AMB EXT BUN: 5 MG/DL
AMB EXT CARBON DIOXIDE: 23
AMB EXT CHLORIDE: 96
AMB EXT GLUCOSE: 83 MG/DL
AMB EXT POSTASSIUM: 4.3 MMOL/L
AMB EXT SODIUM: 124 MMOL/L

## 2023-03-16 PROCEDURE — PSEU8244 CARBAMAZEPINE: Performed by: CLINICAL MEDICAL LABORATORY

## 2023-03-16 PROCEDURE — 80156 ASSAY CARBAMAZEPINE TOTAL: CPT | Performed by: CLINICAL MEDICAL LABORATORY

## 2023-03-17 LAB — CARBAMAZEPINE SERPL-MCNC: 8.7 MCG/ML (ref 4–12)

## 2023-03-21 ENCOUNTER — TELEPHONE (OUTPATIENT)
Dept: NEUROLOGY | Facility: CLINIC | Age: 38
End: 2023-03-21

## 2023-03-21 DIAGNOSIS — G40.909 SEIZURE DISORDER (HCC): ICD-10-CM

## 2023-03-21 DIAGNOSIS — G40.909 RECURRENT SEIZURES (HCC): Primary | ICD-10-CM

## 2023-03-21 NOTE — TELEPHONE ENCOUNTER
Rec'd lab results from [address] [place] [address], dated on 3/21/23; placed in nurses bin for review

## 2023-03-22 NOTE — TELEPHONE ENCOUNTER
Confirmed with Dr. Bandar Torres that BMP is most appropriate for recheck. Lamar Nick (OK per Belma Collet) at [address] [place] [address] informed of results and recommendations. Order faxed to [phone] with receipt confirmation.

## 2023-03-22 NOTE — TELEPHONE ENCOUNTER
Labs reviewed, hyponatremia, please let pt know this is due to seizure medication, please call office if there is any new symptoms. Please replace sodium/add sodium intake and recheck level in 2 months.

## 2023-03-22 NOTE — TELEPHONE ENCOUNTER
Lab results sent to scanning and also faxed to [address] [place] [address] with fax confirmation received.

## 2023-04-13 DIAGNOSIS — N30.01 ACUTE CYSTITIS WITH HEMATURIA: ICD-10-CM

## 2023-04-13 RX ORDER — CARBAMAZEPINE 200 MG/1
200 TABLET ORAL 3 TIMES DAILY
Qty: 270 TABLET | Refills: 3 | Status: SHIPPED | OUTPATIENT
Start: 2023-04-13

## 2023-04-19 ENCOUNTER — TELEPHONE (OUTPATIENT)
Dept: FAMILY MEDICINE CLINIC | Facility: CLINIC | Age: 38
End: 2023-04-19

## 2023-04-19 NOTE — TELEPHONE ENCOUNTER
[facility] called the referral line seeking refill on patient's Flonase.    They can be reached at [phone] [FreeTextEntry1] : Feeling well\par No new symptoms\par Weight stable\par Appetite  good\par  [de-identified] : Recent PET/CT  lung lesion  ? inflammatory   RUL\par    repeat next month\par Colonoscopy due

## 2023-04-21 RX ORDER — FLUTICASONE PROPIONATE 50 MCG
1 SPRAY, SUSPENSION (ML) NASAL DAILY
Qty: 3 EACH | Refills: 3 | Status: SHIPPED | OUTPATIENT
Start: 2023-04-21

## 2023-05-11 ENCOUNTER — OFFICE VISIT (OUTPATIENT)
Dept: FAMILY MEDICINE CLINIC | Facility: CLINIC | Age: 38
End: 2023-05-11
Payer: MEDICARE

## 2023-05-11 VITALS
DIASTOLIC BLOOD PRESSURE: 62 MMHG | OXYGEN SATURATION: 98 % | HEART RATE: 86 BPM | WEIGHT: 126.19 LBS | RESPIRATION RATE: 16 BRPM | SYSTOLIC BLOOD PRESSURE: 122 MMHG | BODY MASS INDEX: 21 KG/M2

## 2023-05-11 DIAGNOSIS — E87.1 HYPONATREMIA: ICD-10-CM

## 2023-05-11 DIAGNOSIS — F84.0 AUTISM: ICD-10-CM

## 2023-05-11 DIAGNOSIS — G40.909 SEIZURE DISORDER (HCC): ICD-10-CM

## 2023-05-11 DIAGNOSIS — E78.00 PURE HYPERCHOLESTEROLEMIA: Primary | ICD-10-CM

## 2023-05-11 DIAGNOSIS — Z12.31 VISIT FOR SCREENING MAMMOGRAM: ICD-10-CM

## 2023-05-11 PROCEDURE — 99214 OFFICE O/P EST MOD 30 MIN: CPT | Performed by: FAMILY MEDICINE

## 2023-05-11 RX ORDER — TIOCONAZOLE 6.5 %
1 OINTMENT WITH PREFILLED APPLICATOR VAGINAL ONCE
COMMUNITY

## 2023-05-11 NOTE — TELEPHONE ENCOUNTER
LOV today w/ Dr Tia Cardenas  These medication have not been previously filled in our office   Will forward to Dr Tia Cardenas

## 2023-05-12 RX ORDER — MELATONIN
1000 DAILY
Qty: 90 TABLET | Refills: 3 | Status: SHIPPED | OUTPATIENT
Start: 2023-05-12

## 2023-05-12 RX ORDER — ERGOCALCIFEROL 1.25 MG/1
50000 CAPSULE ORAL
Qty: 12 CAPSULE | Refills: 3 | Status: SHIPPED | OUTPATIENT
Start: 2023-05-12

## 2023-05-12 RX ORDER — CETIRIZINE HYDROCHLORIDE 10 MG/1
10 TABLET ORAL DAILY
Qty: 90 TABLET | Refills: 3 | Status: SHIPPED | OUTPATIENT
Start: 2023-05-12

## 2023-05-12 RX ORDER — MELOXICAM 15 MG/1
15 TABLET ORAL DAILY
Qty: 30 TABLET | Refills: 5 | Status: SHIPPED | OUTPATIENT
Start: 2023-05-12

## 2023-05-16 RX ORDER — MELOXICAM 15 MG/1
TABLET ORAL
Qty: 31 TABLET | Refills: 11 | OUTPATIENT
Start: 2023-05-16

## 2023-05-16 RX ORDER — ERGOCALCIFEROL 1.25 MG/1
CAPSULE ORAL
Qty: 5 CAPSULE | Refills: 11 | OUTPATIENT
Start: 2023-05-16

## 2023-05-16 RX ORDER — MELATONIN
Qty: 31 TABLET | Refills: 11 | OUTPATIENT
Start: 2023-05-16

## 2023-05-16 RX ORDER — CETIRIZINE HYDROCHLORIDE 10 MG/1
TABLET ORAL
Qty: 31 TABLET | Refills: 11 | OUTPATIENT
Start: 2023-05-16

## 2023-05-19 ENCOUNTER — TELEPHONE (OUTPATIENT)
Dept: SURGERY | Facility: CLINIC | Age: 38
End: 2023-05-19

## 2023-05-19 NOTE — TELEPHONE ENCOUNTER
Rec'd lab results from [address] [place] [address], dated on 5/18/23; placed in nurses bin for review

## 2023-05-30 NOTE — TELEPHONE ENCOUNTER
Ephraim Elam with [facility] is calling patient is having a dental procedure, root canal and they do not use Nitrixoxide so they are asking Dr. Milagro Sutton to order Triazolam.     Please send to [facility]

## 2023-05-31 DIAGNOSIS — F40.232 PHOBIA OF DENTAL PROCEDURE: ICD-10-CM

## 2023-05-31 NOTE — TELEPHONE ENCOUNTER
Evangelina Yoon [phone]  Garrett from [facility] is calling because the script for pts Triazolam 0.25 MG Oral Tab was sent to the wrong pharmacy    Please send to [facility] ([facility]) - [address] [facility].  [phone], [phone]

## 2023-06-05 ENCOUNTER — TELEPHONE (OUTPATIENT)
Dept: FAMILY MEDICINE CLINIC | Facility: CLINIC | Age: 38
End: 2023-06-05

## 2023-06-05 RX ORDER — PENICILLIN V POTASSIUM 250 MG/1
TABLET ORAL
COMMUNITY
Start: 2023-05-24

## 2023-06-05 NOTE — TELEPHONE ENCOUNTER
TRIAZOLAM   Dispensed: 6/2/2023 12:00 AM   Written:  6/2/2023   Unit strength: 0.25 mg   Days supply: 1   Quantity: 1 Not Specified   Pharmacy: [facility] provider: KAVYA Victor DO

## 2023-06-05 NOTE — TELEPHONE ENCOUNTER
Received a fax from [facility] regarding Triazolam 0.25mg tablet  This medication on not on the patient's formulary. A temporary supply has been issued however no additional will be dispensed.  If needed an new Rx for a formulary medication is needed

## 2023-07-17 ENCOUNTER — HOSPITAL ENCOUNTER (OUTPATIENT)
Dept: MAMMOGRAPHY | Age: 38
Discharge: HOME | End: 2023-07-17
Attending: FAMILY MEDICINE
Payer: MEDICARE

## 2023-07-17 DIAGNOSIS — Z12.31 VISIT FOR SCREENING MAMMOGRAM: ICD-10-CM

## 2023-07-17 PROCEDURE — 77067 SCR MAMMO BI INCL CAD: CPT | Performed by: FAMILY MEDICINE

## 2023-07-17 PROCEDURE — 77063 BREAST TOMOSYNTHESIS BI: CPT | Performed by: FAMILY MEDICINE

## 2023-07-18 DIAGNOSIS — R92.2 DENSE BREAST TISSUE ON MAMMOGRAM: Primary | ICD-10-CM

## 2023-08-18 NOTE — TELEPHONE ENCOUNTER
[facility] MEDICINE PROGRESS NOTE   Patient: Carrie Paige  Today's Date: 2023    YOB: 1965  Admission Date: 8/15/2023    MRN: [id number]  Inpatient LOS: 3    Room: 321/01  Hospital Day: Hospital Day: 4    Subjective   HISTORY AND SUBJECTIVE COMPLAINTS     Chief Complaint:   AMS, weakness    Interval History / Subjective:   Patient states she is feeling well this morning. Worked with PT for slide board training and states she was cleared to go home, but wondering if she is cleared \"from other stuff\". Pain in her leg is controlled currently. She denies chest pain, shortness of breath, abdominal pain, nausea, lightheadedness. She would like to try to wean off of O2 today, would prefer not to go home with oxygen.    Following morning meds, patient hypotensive into 80s systolic, symptomatic. Placed in trendelenburg and given IVF bolus with improvement.    Hospital Course:  Carrie Paige is a 57 year old female who presented on 8/15/2023 with complaints of Altered Mental Status    Patient admitted for acute metabolic encephalopathy, acute hypoxemic respiratory failure, and acute kidney injury. Pulmonology, cardiology, orthopedics consulted.    ROS:  Pertinent systems negative except as above.    Objective   PHYSICAL EXAMINATION     Vital 24 Hour Range Most Recent Value   Temperature Temp  Min: 98 °F (36.7 °C)  Max: 99.6 °F (37.6 °C) 99.3 °F (37.4 °C)   Pulse Pulse  Min: 60  Max: 78 69   Respiratory Resp  Min: 16  Max: 18 18   Blood Pressure BP  Min: 129/60  Max: 195/77 129/60   Pulse Oximetry SpO2  Min: 91 %  Max: 96 % 95 %   Arterial BP No data recorded     O2 O2 Flow Rate (L/min)  Av.7 L/min  Min: 1 L/min   Min taken time: 23 2200  Max: 3 L/min   Max taken time: 23 0859       Recorded Intake and Output:    Intake/Output Summary (Last 24 hours) at 2023 0713  Last data filed at 2023 0535  Gross per 24 hour   Intake 639 ml   Output 2300 ml   Net -1661 ml     Spoke with Neetu Lira from the pharmacy who states that [facility], [facility] is a sister pharmacy for local filling in [place]. Prescription was sent over from sister pharmacy, however missing the refills. Advised Nicole to add 3 refills, patient should have enough refills for 1 year. Nicole confirmed reback. Medication: CARBAMAZEPINE 200 MG Oral Tab     Date of last refill: 03/03/2022 (#270/3)  [place]     Last office visit: 03/03/2022  Due back to clinic per last office note:  Around 09/03/2022  Date next office visit scheduled:    Future Appointments   Date Time Provider Daniel Stevens   5/9/2022  9:00 AM Margarita Llamas, DO [facility] 3 [facility]           Last OV note recommendation:    recurrence of seizure, she has  been on tegretol for long time,     change tegretol 200 mg tid,   Change script to tid for a year refill.    Give her refills, check level and lab to call nurse back   Recorded Last Stool Occurrence:       Vital Most Recent Value First Value   Weight 67.7 kg (149 lb 4 oz) Weight: 67.1 kg (147 lb 14.9 oz)   Height 5' 9\" (175.3 cm) Height: 5' 9\" (175.3 cm)   BMI 22.04 N/A     General: Patient sitting in wheelchair. Does not appear to be in acute distress.  HEENT: Normocephalic. Pupils equal. External ears and nose without abnormalities. Oropharynx moist.  CV: Regular rate and rhythm. Normal S1 and S2. No murmurs, rubs, or gallops.  Resp: Normal respiratory effort. No use of accessory muscles. Clear to auscultation throughout peripheral lung fields. On room air.  Abd: Soft, nontender, nondistended. Bowel sounds normoactive.  Ext: No left lower extremity edema. S/p right BKA. Right knee immobilizer in place. Ecchymoses appreciated over right stump, stable.  Neuro: Alert and oriented x3. No gross neuro deficits. Moves all extremities spontaneously.  Psych: Pleasant. Engages in conversation. Appropriate mood and affect.    TEST RESULTS     Labs: The Laboratory values listed below have been reviewed and pertinent findings discussed in the Assessment and Plan.    Recent Labs   Lab 08/18/23  0525 08/17/23  0556 08/16/23  0620 08/15/23  1058   SODIUM 137 137 137 131*   POTASSIUM 4.0 4.2 4.1 4.4   CHLORIDE 102 103 103 96*   CO2 26 24 25 24   BUN 24* 37* 43* 49*   CREATININE 1.29* 1.74* 1.86* 2.59*   GLUCOSE 261* 160* 257* 263*   CALCIUM 7.9* 7.8* 8.1* 8.0*   ANIONGAP 13 14 13 15   MG 1.9 2.0  --  1.8   ALBUMIN 2.4*  --   --  3.2*   AST 32  --   --  45*   GPT 57  --   --  64*   BILIRUBIN 0.6  --   --  0.5     Recent Labs   Lab 08/18/23  0525 08/17/23  1221 08/17/23  0556 08/16/23  0620   WBC 7.9  --  6.5 6.8   HGB 8.5* 8.7* 6.7* 7.2*   HCT 26.4* 26.6* 20.9* 22.7*     --  192 187     Recent Labs   Lab 08/17/23  0722 08/17/23  0940 08/17/23  1234 08/17/23  1730 08/17/23  2155   GLUCOSE BEDSIDE 163* 162* 268* 250* 251*     Radiology:   CXR 8/15/2023  Small right pleural effusion.  VQ  scan 8/15/2023  Low probability for PE.  CT head without contrast 8/15/2023  No acute intracranial findings  Echocardiogram 8/15/2023  Normal left ventricular chamber size, wall thickness and systolic function with no regional wall motion abnormalities. LV EF 61 %.  Increased left atrial chamber size.  Moderate tricuspid valve regurgitation.  Dilated IVC with decreased respiratory variation.  Severe pulmonary hypertension; RVSP 72 mmHg.  Compared to prior study: RVSP is higher (previuosly 51 mm Hg). No evidence for RV strain.  Bilateral lower extremity venous duplex US 8/16/2023  No sonographic evidence for deep venous thrombosis within either lower extremity.  Right knee XR 8/16/2023  Below knee amputation with vascular stents and surgical clips. Mildly displaced fracture of the residual proximal tibia. There is a dominant oblique fracture line in the proximal tibial metaphysis. Deformity of the lateral tibial plateau. A joint effusion is present.     ANCILLARY ORDERS     Diet:  Consistent Carb Moderate (45-75 Gm/Meal) Diet  Telemetry: On  Consults:    IP CONSULT TO PULMONOLOGY  IP CONSULT TO ORTHOPEDIC SURGERY  IP CONSULT TO CARDIOLOGY  Therapy Orders:   PT and OT Orders Placed this Encounter   Procedures   • Occupational Therapy   • Physical Therapy       Advance Care Planning    ADVANCED DIRECTIVES     Code Status: Full Resuscitation          ASSESSMENT AND PLAN     Acute toxic metabolic encephalopathy  -patient presented with confusion, improved with supplemental oxygen  -multifactorial  -improving  -monitor    Acute hypoxemic respiratory failure  -currently requiring 3L  -CXR 8/15 showed small right pleural effusion  -VQ scan 8/15 with low probability of PE  -pulmonology consulted; appreciate assistance  -Eliquis started as noted below  -wean oxygen as tolerated  -consider home O2 evaluation prior to discharge    Elevated D dimer  -D dimer 5.92  -VQ scan as noted above  -bilateral lower extremity venous duplex  US 8/16 negative  -started on Eliquis    Severe pulmonary HTN  -NT proBNP 10,001  -echocardiogram 8/15 showed severe pulmonary HTN, no regional wall motion abnormalities, moderate tricuspid regurg, LVEF 61%  -does not clinically appear to be volume overloaded  -no clear etiology  -pulmonology following as noted above  -?chronic thromboembolic disease and empirically started on Eliquis per pulmonology  -cardiology consulted; appreciate assistance  -outpatient workup    Acute kidney injury on CKD stage 3, resolved  -Cr 2.59>1.29; baseline appears to be ~1.1-1.3  -avoid nephrotoxic agents and renally dose medications as appropriate  -monitor    Peripheral arterial disease, right BKA  -continue statin, plavix    Right tibial plateua fracture  -right knee XR 8/16 showed mildly displaced fracture of the residual proximal tibia, dominant oblique fracture line in the proximal tibia metaphysis, deformity of the lateral tibial plateau  -ortho consulted; appreciate assistance  -recommends non-operative management  -plan to place knee immobilizer, if unable to tolerate would do plaster splint per Dr. Wiggins  -non-weight bearing  -pain control  -PT/OT to evaluate and treat  -discharge planning    Elevated LFTs, resolved  -AST 45>32, ALT 64>57, alk phos 130>167  -total bili 0.6  -on statin  -monitor periodically    Acute on chronic anemia  -Hgb 6.7>8.6; baseline appears to be ~9-10  -?related to injury/fall, has mild ecchymoses over BKA stump  -iron, iron saturation, TIBC low with normal ferritin  -B12 elevated, folate within normal limits  -transfused 1 unit PRBCs 8/17  -IV iron  -continue to monitor     Hyponatremia, resolved  -Na 131>137  -monitor    Generalized weakness, fall  -in setting of above  -CT head 8/15 without acute findings  -PT/OT to evaluate and treat  -discharge planning    Essential HTN, hypotensions  -hypotensive into 80s systolic, improved following IVFs  -continue amlodipine, metoprolol  -stop lasix,  losartan  -monitor    Type 1 diabetes  -A1c 7.6 in June  -glucose acceptable  -continue lantus 10 units BID  -humalog 5 units with correction TID with meals  -monitor glucose and adjust regimen as indicated  -hypoglycemia protocol  -continue gabapentin    Smoking status: former smoker    Nutrition status: appropriate  Body mass index is 22.04 kg/m². - appropriate weight BMI 18.5-24  DVT Prophylaxis: Eliquis         DISCHARGE PLANNING     The patient's treatment plans were discussed with patient, RN,  and consultant(s).     Recommendations for Discharge   SW Home, Home care   PT     OT     SLP        Anticipated discharge destination: Home  Expected Discharge Date: 1-2 days  Barriers to Discharge: Patient is not medically ready and needs to remain in the hospital today due to hypoxia, hypotension        Angelina Rosales PA-C  Hospitalist  8/18/2023  Care in collaboration with Dr. Phelps.    Total time spent today on this visit is 55 minutes which includes reviewing tests in preparation to see patient, obtaining and reviewing separately obtained history, performing a medically appropriate exam and evaluation, counseling and educating the patient/family/caregiver, ordering medications, tests or procedures and communicating with other healthcare professionals.      -------------------------------------    Patient was evaluated in conjunction with APC. I personally saw and examined patient at bedside. I reviewed and discussed the above-mentioned assessment and plan with the APC and we jointly formed the treatment plan above. I also made adjustments as necessary.    On exam: RR, S1/S2    Metabolic encephalopathy - improved  Acute hypoxic respi failure- 2L , VQ scan low prob  Severe pulm HTN - chronic thromboembolic disease - started on Eliquis  pulm follows. Cardiology consulted  Outpatient pulmonary hypertension work up  R tibial plateau fx - ortho consulted - non-operative mgmt  Weakness/fall - PT/OT - SW  discharge planning       JA BRADLEY DO  8/18/2023 1:54 PM

## 2023-09-22 ENCOUNTER — TELEPHONE (OUTPATIENT)
Dept: NEUROLOGY | Facility: CLINIC | Age: 38
End: 2023-09-22

## 2023-09-22 ENCOUNTER — OFFICE VISIT (OUTPATIENT)
Dept: NEUROLOGY | Facility: CLINIC | Age: 38
End: 2023-09-22
Payer: MEDICARE

## 2023-09-22 VITALS
SYSTOLIC BLOOD PRESSURE: 118 MMHG | WEIGHT: 130.81 LBS | HEART RATE: 76 BPM | DIASTOLIC BLOOD PRESSURE: 56 MMHG | RESPIRATION RATE: 16 BRPM | BODY MASS INDEX: 22 KG/M2

## 2023-09-22 DIAGNOSIS — G40.909 SEIZURE DISORDER (HCC): Primary | ICD-10-CM

## 2023-09-22 PROCEDURE — 99214 OFFICE O/P EST MOD 30 MIN: CPT | Performed by: OTHER

## 2023-09-22 RX ORDER — LORAZEPAM 0.5 MG/1
0.5 TABLET ORAL EVERY 4 HOURS PRN
COMMUNITY

## 2023-09-22 NOTE — TELEPHONE ENCOUNTER
Dr Cristina Cochran office visit notes from today's visit faxed to [phone] with fax confirmation received.

## 2023-10-26 NOTE — TELEPHONE ENCOUNTER
Refill request for:    Requested Prescriptions     Pending Prescriptions Disp Refills    MELOXICAM 15 MG Oral Tab [Pharmacy Med Name: Meloxicam 15 MG Tablet] 31 tablet 11     Sig: TAKE 1 TABLET BY MOUTH ONCE EVERY DAY UNTIL SHOULDER PAIN RESOLVES        Last Prescribed Quantity Refills   05/12/23 30tabs 5     LOV 5/11/2023     Patient was asked to follow-up in: 6 months    Appointment due: November 2023    Appointment scheduled: 11/14/2023 Dyana Strong DO    Medication not on protocol.      Routed to DO Antonio for review

## 2023-10-27 RX ORDER — MELOXICAM 15 MG/1
15 TABLET ORAL DAILY PRN
Qty: 31 TABLET | Refills: 11 | Status: SHIPPED | OUTPATIENT
Start: 2023-10-27

## 2023-11-01 ENCOUNTER — TELEPHONE (OUTPATIENT)
Dept: FAMILY MEDICINE CLINIC | Facility: CLINIC | Age: 38
End: 2023-11-01

## 2023-11-01 DIAGNOSIS — Z00.00 ROUTINE HEALTH MAINTENANCE: Primary | ICD-10-CM

## 2023-11-01 NOTE — TELEPHONE ENCOUNTER
Please enter lab orders for the patient's upcoming physical appointment. Physical scheduled: Your appointments       Date & Time Appointment Department [facility])    Nov 14, 2023  9:20 AM CST Medicare Annual [facility] [address], [place] ([place])              [id number]           [facility]     The patient has been notified to complete fasting labs prior to their physical appointment.

## 2024-01-25 ENCOUNTER — OFFICE VISIT (OUTPATIENT)
Dept: FAMILY MEDICINE CLINIC | Facility: CLINIC | Age: 39
End: 2024-01-25
Payer: MEDICARE

## 2024-01-25 VITALS
RESPIRATION RATE: 16 BRPM | HEART RATE: 86 BPM | TEMPERATURE: 97 F | DIASTOLIC BLOOD PRESSURE: 60 MMHG | SYSTOLIC BLOOD PRESSURE: 118 MMHG | OXYGEN SATURATION: 97 % | WEIGHT: 136 LBS | BODY MASS INDEX: 23 KG/M2

## 2024-01-25 DIAGNOSIS — Z00.00 ENCOUNTER FOR ANNUAL HEALTH EXAMINATION: Primary | ICD-10-CM

## 2024-01-25 DIAGNOSIS — G40.909 SEIZURE DISORDER (HCC): ICD-10-CM

## 2024-01-25 DIAGNOSIS — F84.0 AUTISTIC DISORDER, RESIDUAL STATE: ICD-10-CM

## 2024-01-25 DIAGNOSIS — E78.00 PURE HYPERCHOLESTEROLEMIA: ICD-10-CM

## 2024-01-25 DIAGNOSIS — H91.93 HEARING DEFICIT, BILATERAL: ICD-10-CM

## 2024-01-25 PROCEDURE — G0439 PPPS, SUBSEQ VISIT: HCPCS | Performed by: FAMILY MEDICINE

## 2024-01-25 NOTE — PROGRESS NOTES
Subjective:   Sejal Benitez is a 38 year old female who presents for a Medicare Subsequent Annual Wellness visit (Pt already had Initial Annual Wellness) and scheduled follow up of multiple significant but stable problems.     No concerns mentioned by caregiver today..   not available for the patient today which limited ability to obtain history.    Chronic conditions:  -Seizure disorder: on carbemazepine. Follows with neurology.  -Hearing impaired: Patient able to read and utilizes sign language  -Autism, developmental delay: on lexapro, risperidone. Follows with psychiatry. No behavioral concerns.   - Takes OCPs continuously. Never had pap. Saw gyne who noted she cannot tolerate exam.  -HLD: , improved from 167. Cont low cholesterol diet.    Health Maintenance:  Vaccines: reviewed as below. Indicated today: Tdap?  Immunization History   Administered Date(s) Administered    Covid-19 Vaccine Moderna 100 mcg/0.5 ml 02/11/2021, 11/23/2021    Covid-19 Vaccine Moderna Bivalent 50mcg/0.5mL 12+ years 10/11/2022    FLUZONE 6-35 Mos 0.25 ml Dose Quad Split PF ([id number]) 09/16/2020    Flublok Quad Influenza Vaccine ([id number]) 10/23/2020, 10/11/2022, 10/26/2023    Influenza 10/23/2020    Pfizer Covid-19 Vaccine 30mcg/0.3ml 12yrs+ ([id number]) 10/26/2023     Obesity screening: Body mass index is 22.63 kg/m².  Diabetes screening:  negative  Hypercholesterolemia screening: Improved last labs, due for follow up  Depression screen: negative per caregiver  Cervical Cancer screening: never- unable to tolerate, sees gyne  Colon Cancer screening: Last screening: n/a  Breast Cancer screening: Last mammogram: 7/2023    History/Other:   Fall Risk Assessment:   She has been screened for Falls and is low risk.      Cognitive Assessment:   Abnormal  What day of the week is this?: Incorrect  What month is it?: Incorrect  What year is it?: Incorrect  Recall \"Ball\": Incorrect  Recall \"Flag\": Incorrect  Recall \"Tree\":  Incorrect      Functional Ability/Status:   Sejal Benitez has some abnormal functions as listed below:  She has Driving difficulties based on screening of functional status. She has Meal Preparation difficulties based on screening of functional status.She has difficulties Managing Money/Bills based on screening of functional status.She has difficulties Shopping for Groceries based on screening of functional status. She has Hearing problems based on screening of functional status.      Depression Screening (PHQ-2/PHQ-9): PHQ-2 SCORE: 0  , done 1/25/2024     Advanced Directives:   She does NOT have a Living Will. [Do you have a living will?: No]  She does have a POA but we do NOT have it on file in Epic.      Patient Active Problem List   Diagnosis    Seizure disorder (HCC)    Recurrent seizures (HCC)    Behavior problem, adult    Autism    Hearing deficit, bilateral    Hordeolum internum    Exotropia    Pure hypercholesterolemia    Vitamin D deficiency     Allergies:  She has No Known Allergies.    Current Medications:  Outpatient Medications Marked as Taking for the 1/25/24 encounter (Office Visit) with Margarita Llamas, DO   Medication Sig    Meloxicam 15 MG Oral Tab Take 1 tablet (15 mg total) by mouth daily as needed for Pain.    LORazepam 0.5 MG Oral Tab Take 1 tablet (0.5 mg total) by mouth every 4 (four) hours as needed for Anxiety.    guaiFENesin (ROBAFEN OR) Take by mouth.    Triazolam 0.25 MG Oral Tab Take 1 tablet (250 mcg total) by mouth nightly as needed.    cetirizine 10 MG Oral Tab Take 1 tablet (10 mg total) by mouth daily.    cyanocobalamin 1000 MCG Oral Tab Take 1 tablet (1,000 mcg total) by mouth daily.    ergocalciferol 1.25 MG (41154 UT) Oral Cap Take 1 capsule (50,000 Units total) by mouth every 7 days.    Miconazole Nitrate (MONISTAT 1 DAY OR NIGHT) 1200 & 2 MG & % Vaginal Kit Place 1 each vaginally once.    fluticasone propionate 50 MCG/ACT Nasal Suspension 1 spray by Each Nare route  daily.    carBAMazepine 200 MG Oral Tab Take 1 tablet (200 mg total) by mouth 3 (three) times daily.    risperiDONE 1 MG Oral Tab Take 1 tablet (1 mg total) by mouth 2 (two) times daily.    escitalopram 20 MG Oral Tab Take 1 tablet (20 mg total) by mouth every morning.    ALYACEN 1/35 1-35 MG-MCG Oral Tab     Acetaminophen 325 MG Oral Cap Take 325 tablets by mouth as needed. Pt taking 2 tablets (650mg) by mouth every 6 hours as needed for pain or fever    ibuprofen 400 MG Oral Tab Take 1 tablet (400 mg total) by mouth as needed.    Etodolac 400 MG Oral Tab Take 1 tablet (400 mg total) by mouth 2 (two) times daily.       Medical History:  She  has a past medical history of Autism, Deaf, Mild mental retardation, and Seizure disorder (HCC).  Surgical History:  She  has no past surgical history on file.   Family History:  Her family history is not on file.  Social History:  She  reports that she has never smoked. She has never used smokeless tobacco. She reports that she does not currently use alcohol. She reports that she does not currently use drugs.    Tobacco:  She has never smoked tobacco.    CAGE Alcohol Screen:   CAGE screening score of 0 on 1/26/2024, showing low risk of alcohol abuse.      Patient Care Team:  Margarita Llamas DO as PCP - General (Family Medicine)  Jazmin Rod MD as Consulting Physician (NEUROLOGY)  Alonso Gary MD (Family Practice)  Lonny Rod MD as Consulting Physician (NEUROLOGY)    Review of Systems     History obtained from caregiver and unobtainable from patient due to mental status and no  available    Objective:   Physical Exam  General Appearance:  Alert, cooperative, no distress, appears stated age   Head:  Normocephalic, without obvious abnormality, atraumatic   Eyes:  conjunctiva/corneas clear, EOM's intact both eyes   Ears:  Normal TM's and external ear canals, both ears   Throat: Lips, mucosa, and tongue normal; teeth and gums normal   Neck: Supple, symmetrical,  trachea midline, no adenopathy;  thyroid: not enlarged, symmetric, no tenderness/mass/nodules; no carotid bruit or JVD   Lungs:   Clear to auscultation bilaterally, respirations unlabored   Heart:  Regular rate and rhythm, S1 and S2 normal, no murmur, rub, or gallop   Abdomen:   Soft, non-tender, bowel sounds active all four quadrants,  no masses, no organomegaly   Pelvic: Deferred   Extremities: Extremities normal, atraumatic, no cyanosis or edema   Neurologic: Grossly Normal         /60   Pulse 86   Temp 97.1 °F (36.2 °C)   Resp 16   Wt 136 lb (61.7 kg)   LMP  (LMP Unknown)   SpO2 97%   BMI 22.63 kg/m²  Estimated body mass index is 22.63 kg/m² as calculated from the following:    Height as of 11/10/22: 5' 5\" (1.651 m).    Weight as of this encounter: 136 lb (61.7 kg).    Medicare Hearing Assessment:   Hearing Screening    Time taken: 1/25/2024  1:34 PM  Screening Method: Whisper Test  Whisper Test Result: Fail                     Assessment & Plan:   Sejal Benitez is a 38 year old female who presents for a Medicare Assessment.     1. Encounter for annual health examination (Primary)  2. Seizure disorder (HCC)- controlled. Cont with neurology.   3. Pure hypercholesterolemia- Improved. Cont healthy diet.  4. Hearing deficit, bilateral- STable. To offer ASL as much as possible to avoid isolation  5. Autistic disorder, residual state- Cont with psychiatry. No change in meds or behaviors recently.     The patient indicates understanding of these issues and agrees to the plan.  Continue with current treatment plan.  Reinforced healthy diet, lifestyle, and exercise.    RTC in 6 months.   Margarita Llamas DO, 1/25/2024     Supplementary Documentation:   General Health:  In the past six months, have you lost more than 10 pounds without trying?: 2 - No  Has your appetite been poor?: No  Type of Diet: Balanced  How does the patient maintain a good energy level?: Stretching  How would you describe your daily  physical activity?: Light  How would you describe your current health state?: Good  How do you maintain positive mental well-being?: Social Interaction;Visiting Family;Visiting Friends  On a scale of 0 to 10, with 0 being no pain and 10 being severe pain, what is your pain level?: 2 - (Mild)  In the past six months, have you experienced urine leakage?: 0-No  At any time do you feel concerned for the safety/well-being of yourself and/or your children, in your home or elsewhere?: No  Have you had any immunizations at another office such as Influenza, Hepatitis B, Tetanus, or Pneumococcal?: No       Sejal Benitez's SCREENING SCHEDULE   Tests on this list are recommended by your physician but may not be covered, or covered at this frequency, by your insurer.   Please check with your insurance carrier before scheduling to verify coverage.   PREVENTATIVE SERVICES FREQUENCY &  COVERAGE DETAILS LAST COMPLETION DATE   Diabetes Screening    Fasting Blood Sugar /  Glucose    One screening every 12 months if never tested or if previously tested but not diagnosed with pre-diabetes   One screening every 6 months if diagnosed with pre-diabetes Lab Results   Component Value Date    GLU 83 03/16/2023        Cardiovascular Disease Screening    Lipid Panel  Cholesterol  Lipoprotein (HDL)  Triglycerides Covered every 5 years for all Medicare beneficiaries without apparent signs or symptoms of cardiovascular disease No results found for: \"CHOLEST\", \"HDL\", \"LDL\", \"LDLD\", \"LDLC\", \"TRIG\"      Electrocardiogram (EKG)   Covered if needed at Welcome to Medicare, and non-screening if indicated for medical reasons -      Ultrasound Screening for Abdominal Aortic Aneurysm (AAA) Covered once in a lifetime for one of the following risk factors    Men who are 65-75 years old and have ever smoked    Anyone with a family history -     Colorectal Cancer Screening  Covered for ages 50-85; only need ONE of the following:    Colonoscopy   Covered  every 10 years    Covered every 2 years if patient is at high risk or previous colonoscopy was abnormal -    No recommendations at this time    Flexible Sigmoidoscopy   Covered every 4 years -    Fecal Occult Blood Test Covered annually -   Bone Density Screening    Bone density screening    Covered every 2 years after age 65 if diagnosed with risk of osteoporosis or estrogen deficiency.    Covered yearly for long-term glucocorticoid medication use (Steroids) No results found for this or any previous visit.      No recommendations at this time   Pap and Pelvic    Pap   Covered every 2 years for women at normal risk; Annually if at high risk -  Health Maintenance   Topic Date Due    Pap Smear  Never done       Chlamydia Annually if high risk -  No recommendations at this time   Screening Mammogram    Mammogram     Recommend annually for all female patients aged 40 and older    One baseline mammogram covered for patients aged 35-39 07/17/2023    Health Maintenance   Topic Date Due    Mammogram  07/17/2024       Immunizations    Influenza Covered once per flu season  Please get every year 10/26/2023  No recommendations at this time    Pneumococcal Each vaccine ([id number] & [id number]) covered once after 65 Prevnar 13: -    [id number]: -     No recommendations at this time    Hepatitis B One screening covered for patients with certain risk factors   -  No recommendations at this time    Tetanus Toxoid Not covered by Medicare Part B unless medically necessary (cut with metal); may be covered with your pharmacy prescription benefits -    Tetanus, Diptheria and Pertusis TD and TDaP Not covered by Medicare Part B -  DTaP,Tdap,and Td Vaccines(1 - Tdap) Never done    Zoster Not covered by Medicare Part B; may be covered with your pharmacy  prescription benefits -  No recommendations at this time     Annual Monitoring of Persistent Medications (ACE/ARB, digoxin diuretics, anticonvulsants)    Potassium Annually Lab Results    Component Value Date    K 4.3 03/16/2023         Creatinine   Annually Lab Results   Component Value Date    CREATSERUM 0.62 03/16/2022         BUN Annually Lab Results   Component Value Date    BUN 5 03/16/2023       Drug Serum Conc Annually No results found for: \"DIGOXIN\", \"DIG\", \"VALP\"

## 2024-01-26 NOTE — PATIENT INSTRUCTIONS
Sejal Benitez's SCREENING SCHEDULE   Tests on this list are recommended by your physician but may not be covered, or covered at this frequency, by your insurer.   Please check with your insurance carrier before scheduling to verify coverage.   PREVENTATIVE SERVICES FREQUENCY &  COVERAGE DETAILS LAST COMPLETION DATE   Diabetes Screening    Fasting Blood Sugar /  Glucose    One screening every 12 months if never tested or if previously tested but not diagnosed with pre-diabetes   One screening every 6 months if diagnosed with pre-diabetes Lab Results   Component Value Date    GLU 83 03/16/2023        Cardiovascular Disease Screening    Lipid Panel  Cholesterol  Lipoprotein (HDL)  Triglycerides Covered every 5 years for all Medicare beneficiaries without apparent signs or symptoms of cardiovascular disease No results found for: \"CHOLEST\", \"HDL\", \"LDL\", \"LDLD\", \"LDLC\", \"TRIG\"      Electrocardiogram (EKG)   Covered if needed at Welcome to Medicare, and non-screening if indicated for medical reasons -      Ultrasound Screening for Abdominal Aortic Aneurysm (AAA) Covered once in a lifetime for one of the following risk factors   • Men who are 65-75 years old and have ever smoked   • Anyone with a family history -     Colorectal Cancer Screening  Covered for ages 50-85; only need ONE of the following:    Colonoscopy   Covered every 10 years    Covered every 2 years if patient is at high risk or previous colonoscopy was abnormal -    No recommendations at this time    Flexible Sigmoidoscopy   Covered every 4 years -    Fecal Occult Blood Test Covered annually -   Bone Density Screening    Bone density screening    Covered every 2 years after age 65 if diagnosed with risk of osteoporosis or estrogen deficiency.    Covered yearly for long-term glucocorticoid medication use (Steroids) No results found for this or any previous visit.      No recommendations at this time   Pap and Pelvic    Pap   Covered every 2 years for  women at normal risk; Annually if at high risk -  Health Maintenance   Topic Date Due   • Pap Smear  Never done       Chlamydia Annually if high risk -  No recommendations at this time   Screening Mammogram    Mammogram     Recommend annually for all female patients aged 40 and older    One baseline mammogram covered for patients aged 35-39 07/17/2023    Health Maintenance   Topic Date Due   • Mammogram  07/17/2024       Immunizations    Influenza Covered once per flu season  Please get every year 10/26/2023  No recommendations at this time    Pneumococcal Each vaccine ([id number] & [id number]) covered once after 65 Prevnar 13: -    [id number]: -     No recommendations at this time    Hepatitis B One screening covered for patients with certain risk factors   -  No recommendations at this time    Tetanus Toxoid Not covered by Medicare Part B unless medically necessary (cut with metal); may be covered with your pharmacy prescription benefits -    Tetanus, Diptheria and Pertusis TD and TDaP Not covered by Medicare Part B -  DTaP,Tdap,and Td Vaccines(1 - Tdap) Never done    Zoster Not covered by Medicare Part B; may be covered with your pharmacy  prescription benefits -  No recommendations at this time     Annual Monitoring of Persistent Medications (ACE/ARB, digoxin diuretics, anticonvulsants)    Potassium Annually Lab Results   Component Value Date    K 4.3 03/16/2023         Creatinine   Annually Lab Results   Component Value Date    CREATSERUM 0.62 03/16/2022         BUN Annually Lab Results   Component Value Date    BUN 5 03/16/2023       Drug Serum Conc Annually No results found for: \"DIGOXIN\", \"DIG\", \"VALP\"

## 2024-03-27 DIAGNOSIS — E53.8 VITAMIN B12 DEFICIENCY: Primary | ICD-10-CM

## 2024-03-27 DIAGNOSIS — N30.01 ACUTE CYSTITIS WITH HEMATURIA: ICD-10-CM

## 2024-03-28 RX ORDER — MELATONIN
Qty: 30 TABLET | Refills: 11 | Status: SHIPPED | OUTPATIENT
Start: 2024-03-28

## 2024-03-28 RX ORDER — CARBAMAZEPINE 200 MG/1
200 TABLET ORAL 3 TIMES DAILY
Qty: 93 TABLET | Refills: 11 | Status: SHIPPED | OUTPATIENT
Start: 2024-03-28

## 2024-03-28 NOTE — TELEPHONE ENCOUNTER
Refill request for:    Requested Prescriptions     Pending Prescriptions Disp Refills    CYANOCOBALAMIN 1000 MCG Oral Tab [Pharmacy Med Name: Vitamin B-12 1000 MCG Tablet] 30 tablet 11     Sig: TAKE 1 TABLET BY MOUTH ONCE EVERY DAY (SELF ADMINISTER)        Last Prescribed Quantity Refills   5/12/23 90 3     LOV 1/25/2024     Patient was asked to follow-up in: not documented in note.    Appointment scheduled: 7/11/2024 Margarita Llamas DO    Medication not on protocol.     Blood work not done    # 30 with 0 refills routed to Margarita Llamas DO for review

## 2024-03-28 NOTE — TELEPHONE ENCOUNTER
Medication: CARBAMAZEPINE 200 MG Oral Tab      Date of last refill: 04/13/2023 (#270/3)  [place]     Last office visit: 09/22/2023  Due back to clinic per last office note:  around 1 year   Date next office visit scheduled:    Future Appointments   Date Time Provider Department Center   7/11/2024  1:20 PM Margarita Llamas DO [facility]   9/25/2024  9:20 AM Lonny Rod MD [facility]           Last OV note recommendation:    Assessment:   Seizure disorder, she has been on tegretol for long time, no seizure     Plan:  Cont Tegretol 200 mg tid  monitor tegretol level and CMP by PCP  PCP to follow  RTC 12 months  Pt should go ER for any new or worsening symptoms

## 2024-04-10 NOTE — TELEPHONE ENCOUNTER
Requested Prescriptions     Pending Prescriptions Disp Refills    ERGOCALCIFEROL 1.25 MG (21386 UT) Oral Cap [Pharmacy Med Name: Vitamin D (Ergocalciferol) 1.25 MG (01277 UT) Capsule] 4 capsule 11     Sig: TAKE 1 CAPSULE BY MOUTH ONCE WEEKLY (WEDNESDAY) (SELF ADMINISTER)    ALLERGY RELIEF CETIRIZINE 10 MG Oral Tab [Pharmacy Med Name: Allergy Relief Cetirizine 10 MG Tablet] 30 tablet 11     Sig: TAKE 1 TABLET BY MOUTH ONCE EVERY DAY FOR ALLERGIES (8PM)     LOV 1/25/2024     Patient was asked to follow-up in: not noted    Appointment scheduled: 7/11/2024 Margarita Llamas, DO       Does Pt need to continue high dose Vit D?

## 2024-04-13 RX ORDER — ERGOCALCIFEROL 1.25 MG/1
50000 CAPSULE ORAL WEEKLY
Qty: 4 CAPSULE | Refills: 11 | Status: SHIPPED | OUTPATIENT
Start: 2024-04-13

## 2024-04-13 RX ORDER — CETIRIZINE HYDROCHLORIDE 10 MG/1
10 TABLET ORAL DAILY
Qty: 30 TABLET | Refills: 11 | Status: SHIPPED | OUTPATIENT
Start: 2024-04-13

## 2024-04-14 DIAGNOSIS — N30.01 ACUTE CYSTITIS WITH HEMATURIA: ICD-10-CM

## 2024-04-16 RX ORDER — CARBAMAZEPINE 200 MG/1
TABLET ORAL
Qty: 90 TABLET | Refills: 10 | Status: SHIPPED | OUTPATIENT
Start: 2024-04-16

## 2024-04-16 NOTE — TELEPHONE ENCOUNTER
Approved prescription per previous order to go to [facility] who is taking over [facility].  Medication: Carbamazepine 200 mg     Date of last refill: 03/28/2024 (#93/11)  [place]12/2024     Last office visit: 09/23/2023  Due back to clinic per last office note:  1 year  Date next office visit scheduled:    Future Appointments   Date Time Provider Department Center   7/11/2024  1:20 PM Margarita Llamas DO [facility]   9/25/2024  9:20 AM Lonny Rod MD [facility]           Last OV note recommendation:    Plan:  Cont Tegretol 200 mg tid  monitor tegretol level and CMP by PCP  PCP to follow  RTC 12 months  Pt should go ER for any new or worsening symptoms

## 2024-04-26 ENCOUNTER — MED REC SCAN ONLY (OUTPATIENT)
Dept: NEUROLOGY | Facility: CLINIC | Age: 39
End: 2024-04-26

## 2024-04-26 ENCOUNTER — TELEPHONE (OUTPATIENT)
Dept: NEUROLOGY | Facility: CLINIC | Age: 39
End: 2024-04-26

## 2024-04-26 NOTE — TELEPHONE ENCOUNTER
Incoming blood work results received    -CBC result   -Thyroid   -Lipid  -CMP    Is results from multiple date ranges    Placed in provider's inbox for review.

## 2024-06-27 ENCOUNTER — LABORATORY ENCOUNTER (OUTPATIENT)
Dept: LAB | Age: 39
End: 2024-06-27
Attending: FAMILY MEDICINE
Payer: MEDICARE

## 2024-06-27 DIAGNOSIS — Z00.00 ROUTINE HEALTH MAINTENANCE: ICD-10-CM

## 2024-06-27 LAB
ALBUMIN SERPL-MCNC: 3.9 G/DL (ref 3.4–5)
ALBUMIN/GLOB SERPL: 1.5 {RATIO} (ref 1–2)
ALP LIVER SERPL-CCNC: 54 U/L
ALT SERPL-CCNC: 20 U/L
ANION GAP SERPL CALC-SCNC: 10 MMOL/L (ref 0–18)
AST SERPL-CCNC: 12 U/L (ref 15–37)
BASOPHILS # BLD AUTO: 0.02 X10(3) UL (ref 0–0.2)
BASOPHILS NFR BLD AUTO: 0.5 %
BILIRUB SERPL-MCNC: 0.5 MG/DL (ref 0.1–2)
BUN BLD-MCNC: 3 MG/DL (ref 9–23)
CALCIUM BLD-MCNC: 8.5 MG/DL (ref 8.5–10.1)
CHLORIDE SERPL-SCNC: 94 MMOL/L (ref 98–112)
CHOLEST SERPL-MCNC: 221 MG/DL (ref ?–200)
CO2 SERPL-SCNC: 23 MMOL/L (ref 21–32)
CREAT BLD-MCNC: 0.56 MG/DL
EGFRCR SERPLBLD CKD-EPI 2021: 118 ML/MIN/1.73M2 (ref 60–?)
EOSINOPHIL # BLD AUTO: 0.01 X10(3) UL (ref 0–0.7)
EOSINOPHIL NFR BLD AUTO: 0.2 %
ERYTHROCYTE [DISTWIDTH] IN BLOOD BY AUTOMATED COUNT: 11.6 %
FASTING PATIENT LIPID ANSWER: YES
FASTING STATUS PATIENT QL REPORTED: YES
GLOBULIN PLAS-MCNC: 2.6 G/DL (ref 2.8–4.4)
GLUCOSE BLD-MCNC: 85 MG/DL (ref 70–99)
HCT VFR BLD AUTO: 38.5 %
HDLC SERPL-MCNC: 96 MG/DL (ref 40–59)
HGB BLD-MCNC: 14.1 G/DL
IMM GRANULOCYTES # BLD AUTO: 0.01 X10(3) UL (ref 0–1)
IMM GRANULOCYTES NFR BLD: 0.2 %
LDLC SERPL CALC-MCNC: 118 MG/DL (ref ?–100)
LYMPHOCYTES # BLD AUTO: 0.92 X10(3) UL (ref 1–4)
LYMPHOCYTES NFR BLD AUTO: 21.1 %
MCH RBC QN AUTO: 31.5 PG (ref 26–34)
MCHC RBC AUTO-ENTMCNC: 36.6 G/DL (ref 31–37)
MCV RBC AUTO: 85.9 FL
MONOCYTES # BLD AUTO: 0.49 X10(3) UL (ref 0.1–1)
MONOCYTES NFR BLD AUTO: 11.2 %
NEUTROPHILS # BLD AUTO: 2.92 X10 (3) UL (ref 1.5–7.7)
NEUTROPHILS # BLD AUTO: 2.92 X10(3) UL (ref 1.5–7.7)
NEUTROPHILS NFR BLD AUTO: 66.8 %
NONHDLC SERPL-MCNC: 125 MG/DL (ref ?–130)
OSMOLALITY SERPL CALC.SUM OF ELEC: 260 MOSM/KG (ref 275–295)
PLATELET # BLD AUTO: 220 10(3)UL (ref 150–450)
POTASSIUM SERPL-SCNC: 4 MMOL/L (ref 3.5–5.1)
PROT SERPL-MCNC: 6.5 G/DL (ref 6.4–8.2)
RBC # BLD AUTO: 4.48 X10(6)UL
SODIUM SERPL-SCNC: 127 MMOL/L (ref 136–145)
TRIGL SERPL-MCNC: 37 MG/DL (ref 30–149)
TSI SER-ACNC: 1.12 MIU/ML (ref 0.36–3.74)
VLDLC SERPL CALC-MCNC: 6 MG/DL (ref 0–30)
WBC # BLD AUTO: 4.4 X10(3) UL (ref 4–11)

## 2024-06-27 PROCEDURE — 85025 COMPLETE CBC W/AUTO DIFF WBC: CPT

## 2024-06-27 PROCEDURE — 80053 COMPREHEN METABOLIC PANEL: CPT

## 2024-06-27 PROCEDURE — 80061 LIPID PANEL: CPT

## 2024-06-27 PROCEDURE — 84443 ASSAY THYROID STIM HORMONE: CPT

## 2024-06-27 PROCEDURE — 36415 COLL VENOUS BLD VENIPUNCTURE: CPT

## 2024-07-11 ENCOUNTER — OFFICE VISIT (OUTPATIENT)
Dept: FAMILY MEDICINE CLINIC | Facility: CLINIC | Age: 39
End: 2024-07-11
Payer: MEDICARE

## 2024-07-11 VITALS
WEIGHT: 138 LBS | BODY MASS INDEX: 23 KG/M2 | RESPIRATION RATE: 16 BRPM | DIASTOLIC BLOOD PRESSURE: 70 MMHG | TEMPERATURE: 98 F | SYSTOLIC BLOOD PRESSURE: 108 MMHG | HEART RATE: 86 BPM | OXYGEN SATURATION: 98 %

## 2024-07-11 DIAGNOSIS — F33.42 RECURRENT MAJOR DEPRESSIVE DISORDER, IN FULL REMISSION (HCC): ICD-10-CM

## 2024-07-11 DIAGNOSIS — F84.0 AUTISM (HCC): ICD-10-CM

## 2024-07-11 DIAGNOSIS — E78.00 PURE HYPERCHOLESTEROLEMIA: Primary | ICD-10-CM

## 2024-07-11 DIAGNOSIS — G40.909 SEIZURE DISORDER (HCC): ICD-10-CM

## 2024-07-11 PROCEDURE — 99213 OFFICE O/P EST LOW 20 MIN: CPT | Performed by: FAMILY MEDICINE

## 2024-07-11 NOTE — PROGRESS NOTES
Chief Complaint:  Chief Complaint   Patient presents with    Follow - Up     6 months       HPI:  This is a 39 year old female patient presenting for Follow - Up (6 months)        Chronic conditions:  -Seizure disorder: on carbemazepine. Follows with neurology  -Hyponatremia: Stable 127  -Hearing impaired: Patient able to read and utilizes sign language  -Autism, developmental delay: on lexapro, risperidone. Follows with psychiatry. No behavioral concerns.   - Takes OCPs continuously. Never had pap. Saw gyne who noted she cannot tolerate exam.  -HLD: , stable. Cont low cholesterol diet.    Health Maintenance:  Health Maintenance   Topic Date Due    DTaP,Tdap,and Td Vaccines (1 - Tdap) Never done    Pap Smear  Never done    Mammogram  07/17/2024    Influenza Vaccine (1) 10/01/2024    Annual Physical  01/25/2025    Annual Depression Screening  Completed    COVID-19 Vaccine  Completed    Pneumococcal Vaccine: Birth to 64yrs  Aged Out       ROS: Review of systems performed and negative unless stated in HPI.    Past medical, family and social history reviewed and listed as below.    HISTORY:  Past Medical History:    Autism (HCC)    Deaf    Mild mental retardation    Seizure disorder (HCC)      History reviewed. No pertinent surgical history.   History reviewed. No pertinent family history.   Social History     Socioeconomic History    Marital status: Single   Tobacco Use    Smoking status: Never    Smokeless tobacco: Never   Vaping Use    Vaping status: Never Used   Substance and Sexual Activity    Alcohol use: Not Currently    Drug use: Not Currently   Other Topics Concern    Caffeine Concern No     Comment: pop     Exercise No     Comment: min   Social History Narrative    ** Merged History Encounter **             Current Outpatient Medications   Medication Sig Dispense Refill    carBAMazepine 200 MG Oral Tab TAKE 1 TAB BY MOUTH THREE TIMES DAILY AT 8AM, 4PM AND 8PM (SELF ADMINISTER) 90 tablet 10     ergocalciferol 1.25 MG (98027 UT) Oral Cap Take 1 capsule (50,000 Units total) by mouth once a week. 4 capsule 11    cetirizine (ALLERGY RELIEF CETIRIZINE) 10 MG Oral Tab Take 1 tablet (10 mg total) by mouth daily. 30 tablet 11    cyanocobalamin 1000 MCG Oral Tab TAKE 1 TABLET BY MOUTH ONCE EVERY DAY (SELF ADMINISTER) 30 tablet 11    Meloxicam 15 MG Oral Tab Take 1 tablet (15 mg total) by mouth daily as needed for Pain. 31 tablet 11    LORazepam 0.5 MG Oral Tab Take 1 tablet (0.5 mg total) by mouth every 4 (four) hours as needed for Anxiety.      guaiFENesin (ROBAFEN OR) Take by mouth.      Triazolam 0.25 MG Oral Tab Take 1 tablet (250 mcg total) by mouth nightly as needed.      Miconazole Nitrate (MONISTAT 1 DAY OR NIGHT) 1200 & 2 MG & % Vaginal Kit Place 1 each vaginally once.      fluticasone propionate 50 MCG/ACT Nasal Suspension 1 spray by Each Nare route daily. 3 each 3    risperiDONE 1 MG Oral Tab Take 1 tablet (1 mg total) by mouth 2 (two) times daily.      escitalopram 20 MG Oral Tab Take 1 tablet (20 mg total) by mouth every morning.      ALYACEN 1/35 1-35 MG-MCG Oral Tab       Acetaminophen 325 MG Oral Cap Take 325 tablets by mouth as needed. Pt taking 2 tablets (650mg) by mouth every 6 hours as needed for pain or fever      ibuprofen 400 MG Oral Tab Take 1 tablet (400 mg total) by mouth as needed.      Etodolac 400 MG Oral Tab Take 1 tablet (400 mg total) by mouth 2 (two) times daily.       Allergies:  No Known Allergies    EXAM:  Vitals:    07/11/24 1305   BP: 108/70   Pulse: 86   Resp: 16   Temp: 97.6 °F (36.4 °C)   SpO2: 98%   Weight: 138 lb (62.6 kg)     GENERAL: vitals reviewed and listed above, alert, oriented, appears well hydrated and in no acute distress  HEENT: atraumatic, conjunctiva clear, no obvious abnormalities on inspection   LUNGS: clear to auscultation bilaterally, no wheezes, rales or rhonchi, good air movement  CV: HRRR, no murmurs, no peripheral edema   EXTREMITIES: warm and well  perfused  PSYCH: pleasant and cooperative, no obvious depression or anxiety    ASSESSMENT AND PLAN:  Discussed the following assessment   Problem List Items Addressed This Visit          HCC Problems    Seizure disorder (HCC)       Cardiac and Vasculature    Pure hypercholesterolemia - Primary       Neuro    Autism (HCC)     Other Visit Diagnoses       Recurrent major depressive disorder, in full remission (HCC)                Advised the following:  Sejal was seen today for follow - up.    Diagnoses and all orders for this visit:    Pure hypercholesterolemia  Controlled with diet. Will cont to monitor.    Seizure disorder (HCC)  STable. Cont with neurology.    Autism (HCC)  MDD in full remission  Supported living without concerns. Mood controlled with current medications.    RTC in 6 months for VALERIA Llamas DO  7/11/2024 1:13 PM  Family Medicine    Note to Patient  The 21st Century Cures Act makes medical notes like these available to patients in the interest of transparency. However, be advised this is a medical document and is intended as [id number] communication; it is written in medical language and may appear blunt, direct, or contain abbreviations or verbiage that are unfamiliar. Medical documents are intended to carry relevant information, facts as evident, and the clinical opinion of the practitioner.     This report has been produced using speech recognition software, and may contain errors related to grammar, punctuation, spelling, words or phrases unrecognized or not translated appropriately to text; these errors may be referred to the dictating provider for further clarification and/or addendum as needed.   3

## 2024-09-25 ENCOUNTER — TELEPHONE (OUTPATIENT)
Dept: NEUROLOGY | Facility: CLINIC | Age: 39
End: 2024-09-25

## 2024-09-25 ENCOUNTER — OFFICE VISIT (OUTPATIENT)
Dept: NEUROLOGY | Facility: CLINIC | Age: 39
End: 2024-09-25
Payer: MEDICARE

## 2024-09-25 VITALS
HEIGHT: 67 IN | HEART RATE: 71 BPM | DIASTOLIC BLOOD PRESSURE: 80 MMHG | RESPIRATION RATE: 16 BRPM | WEIGHT: 146 LBS | BODY MASS INDEX: 22.91 KG/M2 | OXYGEN SATURATION: 100 % | SYSTOLIC BLOOD PRESSURE: 120 MMHG

## 2024-09-25 DIAGNOSIS — G40.909 SEIZURE DISORDER (HCC): Primary | ICD-10-CM

## 2024-09-25 PROCEDURE — 99214 OFFICE O/P EST MOD 30 MIN: CPT | Performed by: OTHER

## 2024-09-25 NOTE — PATIENT INSTRUCTIONS
Refill policies:    Allow 2-3 business days for refills; controlled substances may take longer.  Contact your pharmacy at least 5 days prior to running out of medication and have them send an electronic request or submit request through the “request refill” option in your [email or web address] account.  Refills are not addressed on weekends; covering physicians do not authorize routine medications on weekends.  No narcotics or controlled substances are refilled after noon on Fridays or by on call physicians.  By law, narcotics must be electronically prescribed.  A 30 day supply with no refills is the maximum allowed.  If your prescription is due for a refill, you may be due for a follow up appointment.  To best provide you care, patients receiving routine medications need to be seen at least once a year.  Patients receiving narcotic/controlled substance medications need to be seen at least once every 3 months.  In the event that your preferred pharmacy does not have the requested medication in stock (e.g. Backordered), it is your responsibility to find another pharmacy that has the requested medication available.  We will gladly send a new prescription to that pharmacy at your request.    Scheduling Tests:    If your physician has ordered radiology tests such as MRI or CT scans, please contact Central Scheduling at [phone] right away to schedule the test.  Once scheduled, the [facility] Centralized Referral Team will work with your insurance carrier to obtain pre-certification or prior authorization.  Depending on your insurance carrier, approval may take 3-10 days.  It is highly recommended patients assure they have received an authorization before having a test performed.  If test is done without insurance authorization, patient may be responsible for the entire amount billed.      Precertification and Prior Authorizations:  If your physician has recommended that you have a procedure or additional testing performed the [facility]  Centralized Referral Team will contact your insurance carrier to obtain pre-certification or prior authorization.    You are strongly encouraged to contact your insurance carrier to verify that your procedure/test has been approved and is a COVERED benefit.  Although the [facility] Centralized Referral Team does its due diligence, the insurance carrier gives the disclaimer that \"Although the procedure is authorized, this does not guarantee payment.\"    Ultimately the patient is responsible for payment.   Thank you for your understanding in this matter.  Paperwork Completion:  If you require FMLA or disability paperwork for your recovery, please make sure to either drop it off or have it faxed to our office at [phone]. Be sure the form has your name and date of birth on it.  The form will be faxed to our Forms Department and they will complete it for you.  There is a 25$ fee for all forms that need to be filled out.  Please be aware there is a 10-14 day turnaround time.  You will need to sign a release of information (LILO) form if your paperwork does not come with one.  You may call the Forms Department with any questions at [phone].  Their fax number is [phone].

## 2024-09-25 NOTE — PROGRESS NOTES
[facility] Neurology Outpatient Progress Note  Date of service: 9/25/2024    Assessment:     ICD-10-CM    1. Seizure disorder ([facility])  G40.909       Seizure disorder, she has been on tegretol for long time, no seizure     Plan:  Cont current dose  Tegretol 200 mg tid  monitor tegretol level and CMP by PCP, monitor sodium level as tegretol causes hyponatremia  PCP to follow  RTC 12 months  Pt should go ER for any new or worsening symptoms    Subjective:   History:  Patient here for a follow-up visit for seizure,. Since last visit denies seizure, no side effect from medication reported, no new complaints or issues.   was helping interview, no issue reported from pt and staff.   She seems stable overall on current tegretol.      History/Other:   REVIEW OF SYSTEMS:  A 10-point system was reviewed. Pertinent positives and negatives are noted as above       Current Outpatient Medications:     carBAMazepine 200 MG Oral Tab, TAKE 1 TAB BY MOUTH THREE TIMES DAILY AT 8AM, 4PM AND 8PM (SELF ADMINISTER), Disp: 90 tablet, Rfl: 10    ergocalciferol 1.25 MG (81824 UT) Oral Cap, Take 1 capsule (50,000 Units total) by mouth once a week., Disp: 4 capsule, Rfl: 11    cetirizine (ALLERGY RELIEF CETIRIZINE) 10 MG Oral Tab, Take 1 tablet (10 mg total) by mouth daily., Disp: 30 tablet, Rfl: 11    cyanocobalamin 1000 MCG Oral Tab, TAKE 1 TABLET BY MOUTH ONCE EVERY DAY (SELF ADMINISTER), Disp: 30 tablet, Rfl: 11    Meloxicam 15 MG Oral Tab, Take 1 tablet (15 mg total) by mouth daily as needed for Pain., Disp: 31 tablet, Rfl: 11    LORazepam 0.5 MG Oral Tab, Take 1 tablet (0.5 mg total) by mouth every 4 (four) hours as needed for Anxiety., Disp: , Rfl:     guaiFENesin (ROBAFEN OR), Take by mouth., Disp: , Rfl:     fluticasone propionate 50 MCG/ACT Nasal Suspension, 1 spray by Each Nare route daily., Disp: 3 each, Rfl: 3    risperiDONE 1 MG Oral Tab, Take 1 tablet (1 mg total) by mouth 2 (two) times daily., Disp: , Rfl:     escitalopram  20 MG Oral Tab, Take 1 tablet (20 mg total) by mouth every morning., Disp: , Rfl:     Acetaminophen 325 MG Oral Cap, Take 325 tablets by mouth as needed. Pt taking 2 tablets (650mg) by mouth every 6 hours as needed for pain or fever, Disp: , Rfl:     Etodolac 400 MG Oral Tab, Take 1 tablet (400 mg total) by mouth 2 (two) times daily., Disp: , Rfl:     Triazolam 0.25 MG Oral Tab, Take 1 tablet (250 mcg total) by mouth nightly as needed., Disp: , Rfl:     Miconazole Nitrate (MONISTAT 1 DAY OR NIGHT) 1200 & 2 MG & % Vaginal Kit, Place 1 each vaginally once., Disp: , Rfl:     ALYACEN 1/35 1-35 MG-MCG Oral Tab, , Disp: , Rfl:     ibuprofen 400 MG Oral Tab, Take 1 tablet (400 mg total) by mouth as needed., Disp: , Rfl:   Allergies:  No Known Allergies  Past Medical History:    Autism (HCC)    Deaf    Mild mental retardation    Seizure disorder (HCC)     History reviewed. No pertinent surgical history.  Social History:  Social History     Tobacco Use    Smoking status: Never    Smokeless tobacco: Never   Substance Use Topics    Alcohol use: Not Currently     Family history:  Patient denies any pertinent family history associated with the current problem    Objective:   Neurological Examination:  /80   Pulse 71   Resp 16   Ht 67\"   Wt 146 lb (66.2 kg)   SpO2 100%   BMI 22.87 kg/m²   Mental status: Awake, alert, limited  Non verbal, autistic  CN II-XII: PERRLA other intact  Motor strength: 5/5 all extremities  Tone: normal  Coordination: fair  Sensory: symmetric  Gait: fair    Test reviewed on 9/25/2024      Lonny \"Golden\"MD Marcel  Neurology  [facility]  9/25/2024, 9:35 AM  CC: Margarita Llamas DO

## 2025-01-08 ENCOUNTER — TELEPHONE (OUTPATIENT)
Dept: FAMILY MEDICINE CLINIC | Facility: CLINIC | Age: 40
End: 2025-01-08

## 2025-01-08 DIAGNOSIS — Z00.00 ROUTINE HEALTH MAINTENANCE: Primary | ICD-10-CM

## 2025-01-08 NOTE — TELEPHONE ENCOUNTER
Please enter lab orders for the patient's upcoming physical appointment.     Physical scheduled:   Your appointments       Date & Time Appointment Department ([place])    Jan 30, 2025 10:20 AM CST Medicare Annual Well Visit with Margarita Llamas DO [facility], [facility] ([facility])              [facility], [facility]  [address] [place] [address] [place] [id number]  [phone]           Preferred lab: [facility] ([facility])     The patient has been notified to complete fasting labs prior to their physical appointment.

## 2025-01-30 ENCOUNTER — OFFICE VISIT (OUTPATIENT)
Dept: FAMILY MEDICINE CLINIC | Facility: CLINIC | Age: 40
End: 2025-01-30
Payer: MEDICARE

## 2025-01-30 VITALS
HEIGHT: 64.41 IN | BODY MASS INDEX: 24.69 KG/M2 | OXYGEN SATURATION: 98 % | RESPIRATION RATE: 16 BRPM | SYSTOLIC BLOOD PRESSURE: 122 MMHG | HEART RATE: 68 BPM | DIASTOLIC BLOOD PRESSURE: 66 MMHG | WEIGHT: 146.38 LBS | TEMPERATURE: 98 F

## 2025-01-30 DIAGNOSIS — Z00.00 ENCOUNTER FOR ANNUAL HEALTH EXAMINATION: Primary | ICD-10-CM

## 2025-01-30 DIAGNOSIS — N91.1 SECONDARY AMENORRHEA: ICD-10-CM

## 2025-01-30 DIAGNOSIS — H91.93 HEARING DEFICIT, BILATERAL: ICD-10-CM

## 2025-01-30 DIAGNOSIS — Z12.31 VISIT FOR SCREENING MAMMOGRAM: ICD-10-CM

## 2025-01-30 DIAGNOSIS — E78.00 PURE HYPERCHOLESTEROLEMIA: ICD-10-CM

## 2025-01-30 DIAGNOSIS — G40.909 SEIZURE DISORDER (HCC): ICD-10-CM

## 2025-01-30 DIAGNOSIS — E87.1 HYPONATREMIA: ICD-10-CM

## 2025-01-30 DIAGNOSIS — F84.0 AUTISM (HCC): ICD-10-CM

## 2025-01-30 PROBLEM — R68.89 ABNORMAL EXAMINATION: Status: ACTIVE | Noted: 2025-01-30

## 2025-01-30 RX ORDER — DM/PE/ACETAMINOPHEN/CHLORPHENR 10-5-325-2
TABLET, SEQUENTIAL ORAL
COMMUNITY
Start: 2024-10-31

## 2025-01-30 NOTE — PROGRESS NOTES
Subjective:   Sejal Benitez is a 39 year old female who presents for a Subsequent Annual Wellness visit (Pt already had Initial Annual Wellness) and scheduled follow up of multiple significant but stable problems.     Chronic conditions:  -Seizure disorder: on carbemazepine. Follows with neurology  -Hyponatremia: Stable 127  -Hearing impaired: Patient able to read and utilizes sign language  -Autism, developmental delay: on lexapro, risperidone. Follows with psychiatry. No behavioral concerns.   -Previously on continuous OCPs but patient self discontinued in the last several months.  Patient has not had a period.  Saw gynecology and had Pap smear which was negative.  -HLD: , stable. Cont low cholesterol diet.    Health Maintenance:  Vaccines: reviewed as below. Indicated today: Tdap, influenza  Immunization History   Administered Date(s) Administered    Covid-19 Vaccine Moderna 100 mcg/0.5 ml 2021, 2021    Covid-19 Vaccine Moderna Bivalent 50mcg/0.5mL 12+ years 10/11/2022    FLUZONE 6-35 Mos 0.25 ml Dose Quad Split PF (64096) 2020    Flublok Quad Influenza Vaccine (22684) 10/23/2020, 10/11/2022, 10/26/2023    Influenza 10/23/2020    Pfizer Covid-19 Vaccine 30mcg/0.3ml 12yrs+ 10/26/2023     Obesity screening: Body mass index is 24.81 kg/m².  Diabetes screening:  due  Hypercholesterolemia screening:  Lab Results   Component Value Date    HDL 96 (H) 2024     Lab Results   Component Value Date     (H) 2024     Lab Results   Component Value Date    TRIG 37 2024        Depression screen:     Depression Screening (PHQ-2/PHQ-9): Over the LAST 2 WEEKS   Little interest or pleasure in doing things: Not at all    Feeling down, depressed, or hopeless: Not at all    PHQ-2 SCORE: 0        Cervical Cancer screenin2024  Colon Cancer screening: Last screening: n/a  Breast Cancer screening: Last mammogram: 2023      History/Other:   Fall Risk Assessment:   She has been  screened for Falls and is low risk.      Cognitive Assessment:   Abnormal  What day of the week is this?: Correct  What month is it?: Correct  What year is it?: Correct  Recall \"Ball\": Incorrect  Recall \"Flag\": Incorrect  Recall \"Tree\": Correct    Functional Ability/Status:   Sejal Benitez has some abnormal functions as listed below:  She has Driving difficulties based on screening of functional status. She has Meal Preparation difficulties based on screening of functional status.She has difficulties Managing Money/Bills based on screening of functional status.She has difficulties Shopping for Groceries based on screening of functional status. She has Hearing problems based on screening of functional status.She has Vision problems based on screening of functional status.       Depression Screening (PHQ):  PHQ-2 SCORE: 0  , done 1/30/2025   If you checked off any problems, how difficult have these problems made it for you to do your work, take care of things at home, or get along with other people?: Not difficult at all    Advanced Directives:   She does NOT have a Living Will. [ ]  She does have a POA but we do NOT have it on file in Epic.    Not discussed      Patient Active Problem List   Diagnosis    Seizure disorder (HCC)    Recurrent seizures (HCC)    Behavior problem, adult    Autism (HCC)    Hearing deficit, bilateral    Hordeolum internum    Exotropia    Pure hypercholesterolemia    Vitamin D deficiency    Abnormal examination     Allergies:  She has No Known Allergies.    Current Medications:  Outpatient Medications Marked as Taking for the 1/30/25 encounter (Office Visit) with Margarita Llamas,    Medication Sig    GNP VITAMIN B-6 100 MG Oral Tab TAKE 3 TABS BY MOUTH TWICE A DAY AT 7AM & 8PM       Medical History:  She  has a past medical history of Autism (HCC), Deaf, Mild mental retardation, and Seizure disorder (HCC).  Surgical History:  She  has no past surgical history on file.   Family  History:  Her family history is not on file.  Social History:  She  reports that she has never smoked. She has never used smokeless tobacco. She reports that she does not currently use alcohol. She reports that she does not currently use drugs.    Tobacco:  She has never smoked tobacco.    CAGE Alcohol Screen:   CAGE screening score of 0 on 1/30/2025, showing low risk of alcohol abuse.      Patient Care Team:  Margarita Llamas DO as PCP - General (Family Medicine)  Jazmin Rod MD as Consulting Physician (NEUROLOGY)  Alonso Gary MD (Family Practice)  Lonny Rod MD as Consulting Physician (NEUROLOGY)    Review of Systems  Unobtainable due to patient condition    Objective:   Physical Exam  General Appearance:  Alert, cooperative, no distress, appears stated age   Head:  Normocephalic, without obvious abnormality, atraumatic   Eyes:  conjunctiva/corneas clear, EOM's intact both eyes   Ears:  Normal TM's and external ear canals, both ears   Throat: Lips, mucosa, and tongue normal; teeth and gums normal   Neck: Supple, symmetrical, trachea midline, no adenopathy;  thyroid: not enlarged, symmetric, no tenderness/mass/nodules; no carotid bruit or JVD   Lungs:   Clear to auscultation bilaterally, respirations unlabored   Heart:  Regular rate and rhythm, S1 and S2 normal, no murmur, rub, or gallop   Abdomen:   Soft, non-tender, bowel sounds active all four quadrants,  no masses, no organomegaly   Pelvic: Deferred   Extremities: Extremities normal, atraumatic, no cyanosis or edema   Neurologic: Grossly Normal         /66 (BP Location: Left arm)   Pulse 68   Temp 97.9 °F (36.6 °C) (Oral)   Resp 16   Ht 5' 4.41\" (1.636 m)   Wt 146 lb 6.4 oz (66.4 kg)   SpO2 98%   Breastfeeding No   BMI 24.81 kg/m²  Estimated body mass index is 24.81 kg/m² as calculated from the following:    Height as of this encounter: 5' 4.41\" (1.636 m).    Weight as of this encounter: 146 lb 6.4 oz (66.4 kg).    Medicare Hearing  Assessment:   Hearing Screening    Time taken: 1/30/2025 10:49 AM  Screening Method: Whisper Test  Whisper Test Result: Fail (Comment: deaf)         Visual Acuity:   Right Eye Visual Acuity: Corrected Right Eye Chart Acuity: 20/70   Left Eye Visual Acuity: Corrected Left Eye Chart Acuity: 20/40   Both Eyes Visual Acuity: Corrected Both Eyes Chart Acuity: 20/40   Able To Tolerate Visual Acuity: Yes        Assessment & Plan:   Sejal Benitez is a 39 year old female who presents for a Medicare Assessment.     1. Encounter for annual health examination (Primary)  Will update labss, mammogram  -     CBC With Differential With Platelet; Future; Expected date: 01/30/2025  -     Comp Metabolic Panel (14); Future; Expected date: 01/30/2025  -     Lipid Panel; Future; Expected date: 01/30/2025  -     TSH W Reflex To Free T4; Future; Expected date: 01/30/2025  -     Hemoglobin A1C; Future; Expected date: 01/30/2025  2. Visit for screening mammogram  -     [facility] SRI 2D+3D SCREENING BILAT (CPT=77067/83773); Future; Expected date: 01/30/2025  3. Secondary amenorrhea  Unclear if patient is in early menopause or if there is another driving cause for now despite stopping OCPs.  -     Estradiol; Future; Expected date: 01/30/2025  -     FSH AND LH; Future; Expected date: 01/30/2025  -     Prolactin; Future; Expected date: 01/30/2025  4. Seizure disorder (HCC)  Controlled on current medications.  Continue follow-up with neurology  5. Autism (HCC)  Some noted behavioral concerns in the past.  Continue following with psychiatry.  Continue current medications.  6. Pure hypercholesterolemia  Will monitor off medication for now.  7. Hearing deficit, bilateral  We will continue to provide send .  8. Hyponatremia  Likely secondary to carbamazepine.  BMP every 6 months    The patient and caregiver indicates understanding of these issues and agrees to the plan.  Reinforced healthy diet, lifestyle, and exercise.  Return  to clinic in 6 months    Margarita Llamas DO, 1/30/2025     Supplementary Documentation:   General Health:  In the past six months, have you lost more than 10 pounds without trying?: 2 - No  Has your appetite been poor?: No  Type of Diet: Other  How does the patient maintain a good energy level?: Daily Walks  How would you describe your daily physical activity?: Light  How would you describe your current health state?: Good  How do you maintain positive mental well-being?: Social Interaction  On a scale of 0 to 10, with 0 being no pain and 10 being severe pain, what is your pain level?: 3 - (Mild)  In the past six months, have you experienced urine leakage?: 0-No  At any time do you feel concerned for the safety/well-being of yourself and/or your children, in your home or elsewhere?: No  Have you had any immunizations at another office such as Influenza, Hepatitis B, Tetanus, or Pneumococcal?: Yes (covid)    Health Maintenance   Topic Date Due    DTaP,Tdap,and Td Vaccines (1 - Tdap) Never done    Mammogram  07/17/2024    COVID-19 Vaccine (5 - 2024-25 season) 09/01/2024    Influenza Vaccine (1) 10/01/2024    Annual Depression Screening  01/01/2025    Annual Physical  01/25/2025    Pap Smear  04/10/2027    Pneumococcal Vaccine: Birth to 50yrs  Aged Out    Meningococcal B Vaccine  Aged Out

## 2025-02-10 DIAGNOSIS — E53.8 VITAMIN B12 DEFICIENCY: ICD-10-CM

## 2025-02-10 NOTE — TELEPHONE ENCOUNTER
Refill request for:    Requested Prescriptions     Pending Prescriptions Disp Refills    CETIRIZINE 10 MG Oral Tab [Pharmacy Med Name: Cetirizine HCl 10 MG Tablet] 30 tablet 10     Sig: TAKE 1 TAB BY MOUTH DAILY AT 8PM FOR ALLERGIES    ERGOCALCIFEROL 1.25 MG (12457 UT) Oral Cap [Pharmacy Med Name: Vitamin D (Ergocalciferol) 1.25 MG (98122 UT) Capsule] 4 capsule 10     Sig: TAKE ONE CAPSULE BY MOUTH ONCE WEEKLY ON WEDNESDAY AT 7AM (SELF ADMINISTER)    CYANOCOBALAMIN 1000 MCG Oral Tab [Pharmacy Med Name: Vitamin B-12 1000 MCG Tablet] 30 tablet 10     Sig: TAKE 1 TAB BY MOUTH ONCE DAILY AT 7AM (SELF ADMINISTER)         Last Prescribed Quantity Refills   cetirizine (ALLERGY RELIEF CETIRIZINE) 10 MG Oral Tab  04/13/2024 30 11     cyanocobalamin 1000 MCG Oral Tab    03/28/2024 30 11   ergocalciferol 1.25 MG (98961 UT) Oral Cap  04/13/2024 4 11     LOV 1/30/2025     Patient was asked to follow-up in: 6 months    Appointment due: July 2025    Appointment scheduled: 7/15/2025 Margarita Llamas DO    Medication not on protocol.     Refills routed to Margarita Llamas DO for review

## 2025-02-11 RX ORDER — ERGOCALCIFEROL 1.25 MG/1
CAPSULE, LIQUID FILLED ORAL
Qty: 4 CAPSULE | Refills: 10 | Status: SHIPPED | OUTPATIENT
Start: 2025-02-11

## 2025-02-11 RX ORDER — CETIRIZINE HYDROCHLORIDE 10 MG/1
TABLET ORAL
Qty: 30 TABLET | Refills: 10 | Status: SHIPPED | OUTPATIENT
Start: 2025-02-11

## 2025-02-11 RX ORDER — MELATONIN
Qty: 30 TABLET | Refills: 10 | Status: SHIPPED | OUTPATIENT
Start: 2025-02-11

## 2025-03-06 ENCOUNTER — LAB ENCOUNTER (OUTPATIENT)
Dept: LAB | Age: 40
End: 2025-03-06
Attending: FAMILY MEDICINE
Payer: MEDICARE

## 2025-03-06 DIAGNOSIS — Z00.00 ROUTINE HEALTH MAINTENANCE: ICD-10-CM

## 2025-03-06 DIAGNOSIS — Z00.00 ENCOUNTER FOR ANNUAL HEALTH EXAMINATION: ICD-10-CM

## 2025-03-06 DIAGNOSIS — N91.1 SECONDARY AMENORRHEA: ICD-10-CM

## 2025-03-06 LAB
ALBUMIN SERPL-MCNC: 4.6 G/DL (ref 3.2–4.8)
ALBUMIN/GLOB SERPL: 2.2 {RATIO} (ref 1–2)
ALP LIVER SERPL-CCNC: 59 U/L
ALT SERPL-CCNC: 19 U/L
ANION GAP SERPL CALC-SCNC: 8 MMOL/L (ref 0–18)
AST SERPL-CCNC: 23 U/L (ref ?–34)
BASOPHILS # BLD AUTO: 0.02 X10(3) UL (ref 0–0.2)
BASOPHILS NFR BLD AUTO: 0.4 %
BILIRUB SERPL-MCNC: 0.5 MG/DL (ref 0.3–1.2)
BUN BLD-MCNC: 5 MG/DL (ref 9–23)
CALCIUM BLD-MCNC: 9.1 MG/DL (ref 8.7–10.6)
CHLORIDE SERPL-SCNC: 99 MMOL/L (ref 98–112)
CHOLEST SERPL-MCNC: 239 MG/DL (ref ?–200)
CO2 SERPL-SCNC: 27 MMOL/L (ref 21–32)
CREAT BLD-MCNC: 0.54 MG/DL
EGFRCR SERPLBLD CKD-EPI 2021: 119 ML/MIN/1.73M2 (ref 60–?)
EOSINOPHIL # BLD AUTO: 0.01 X10(3) UL (ref 0–0.7)
EOSINOPHIL NFR BLD AUTO: 0.2 %
ERYTHROCYTE [DISTWIDTH] IN BLOOD BY AUTOMATED COUNT: 11.9 %
EST. AVERAGE GLUCOSE BLD GHB EST-MCNC: 74 MG/DL (ref 68–126)
ESTRADIOL SERPL-MCNC: 119.3 PG/ML
FASTING PATIENT LIPID ANSWER: YES
FASTING STATUS PATIENT QL REPORTED: YES
FSH SERPL-ACNC: 4.2 MIU/ML
GLOBULIN PLAS-MCNC: 2.1 G/DL (ref 2–3.5)
GLUCOSE BLD-MCNC: 74 MG/DL (ref 70–99)
HBA1C MFR BLD: 4.2 % (ref ?–5.7)
HCT VFR BLD AUTO: 38.7 %
HDLC SERPL-MCNC: 90 MG/DL (ref 40–59)
HGB BLD-MCNC: 14.1 G/DL
IMM GRANULOCYTES # BLD AUTO: 0.01 X10(3) UL (ref 0–1)
IMM GRANULOCYTES NFR BLD: 0.2 %
LDLC SERPL CALC-MCNC: 139 MG/DL (ref ?–100)
LH SERPL-ACNC: 1.7 MIU/ML
LYMPHOCYTES # BLD AUTO: 0.89 X10(3) UL (ref 1–4)
LYMPHOCYTES NFR BLD AUTO: 18.9 %
MCH RBC QN AUTO: 31.1 PG (ref 26–34)
MCHC RBC AUTO-ENTMCNC: 36.4 G/DL (ref 31–37)
MCV RBC AUTO: 85.4 FL
MONOCYTES # BLD AUTO: 0.46 X10(3) UL (ref 0.1–1)
MONOCYTES NFR BLD AUTO: 9.7 %
NEUTROPHILS # BLD AUTO: 3.33 X10 (3) UL (ref 1.5–7.7)
NEUTROPHILS # BLD AUTO: 3.33 X10(3) UL (ref 1.5–7.7)
NEUTROPHILS NFR BLD AUTO: 70.6 %
NONHDLC SERPL-MCNC: 149 MG/DL (ref ?–130)
OSMOLALITY SERPL CALC.SUM OF ELEC: 274 MOSM/KG (ref 275–295)
PLATELET # BLD AUTO: 200 10(3)UL (ref 150–450)
POTASSIUM SERPL-SCNC: 3.8 MMOL/L (ref 3.5–5.1)
PROLACTIN SERPL-MCNC: 58.5 NG/ML
PROT SERPL-MCNC: 6.7 G/DL (ref 5.7–8.2)
RBC # BLD AUTO: 4.53 X10(6)UL
SODIUM SERPL-SCNC: 134 MMOL/L (ref 136–145)
TRIGL SERPL-MCNC: 60 MG/DL (ref 30–149)
TSI SER-ACNC: 1.13 UIU/ML (ref 0.55–4.78)
VLDLC SERPL CALC-MCNC: 11 MG/DL (ref 0–30)
WBC # BLD AUTO: 4.7 X10(3) UL (ref 4–11)

## 2025-03-06 PROCEDURE — 80061 LIPID PANEL: CPT

## 2025-03-06 PROCEDURE — 82670 ASSAY OF TOTAL ESTRADIOL: CPT

## 2025-03-06 PROCEDURE — 84146 ASSAY OF PROLACTIN: CPT

## 2025-03-06 PROCEDURE — 36415 COLL VENOUS BLD VENIPUNCTURE: CPT

## 2025-03-06 PROCEDURE — 84443 ASSAY THYROID STIM HORMONE: CPT

## 2025-03-06 PROCEDURE — 80053 COMPREHEN METABOLIC PANEL: CPT

## 2025-03-06 PROCEDURE — 85025 COMPLETE CBC W/AUTO DIFF WBC: CPT

## 2025-03-06 PROCEDURE — 83001 ASSAY OF GONADOTROPIN (FSH): CPT

## 2025-03-06 PROCEDURE — 83002 ASSAY OF GONADOTROPIN (LH): CPT

## 2025-03-06 PROCEDURE — 83036 HEMOGLOBIN GLYCOSYLATED A1C: CPT

## 2025-04-02 DIAGNOSIS — N30.01 ACUTE CYSTITIS WITH HEMATURIA: ICD-10-CM

## 2025-04-03 RX ORDER — CARBAMAZEPINE 200 MG/1
TABLET ORAL
Qty: 90 TABLET | Refills: 2 | Status: SHIPPED | OUTPATIENT
Start: 2025-04-03

## 2025-04-03 NOTE — TELEPHONE ENCOUNTER
Medication:  CARBAMAZEPINE 200 MG Oral Tab      Date of last refill: 04/16/2024 (#90/10)  [place]     Last office visit: 09/25/2024  Due back to clinic per last office note:  RTC 12 months  Date next office visit scheduled:    Future Appointments   Date Time Provider Department Center   7/15/2025  9:40 AM Margarita Llamas, DO [facility]   7/29/2025  9:40 AM [facility]           Last OV note recommendation:    Plan:  Cont current dose  Tegretol 200 mg tid  monitor tegretol level and CMP by PCP, monitor sodium level as tegretol causes hyponatremia  PCP to follow  RTC 12 months  Pt should go ER for any new or worsening symptoms

## 2025-04-18 DIAGNOSIS — E53.8 VITAMIN B12 DEFICIENCY: ICD-10-CM

## 2025-04-18 RX ORDER — MELATONIN
Qty: 30 TABLET | Refills: 10 | OUTPATIENT
Start: 2025-04-18

## 2025-07-15 ENCOUNTER — OFFICE VISIT (OUTPATIENT)
Dept: FAMILY MEDICINE CLINIC | Facility: CLINIC | Age: 40
End: 2025-07-15
Payer: MEDICARE

## 2025-07-15 VITALS
RESPIRATION RATE: 16 BRPM | HEART RATE: 84 BPM | DIASTOLIC BLOOD PRESSURE: 68 MMHG | OXYGEN SATURATION: 99 % | SYSTOLIC BLOOD PRESSURE: 100 MMHG | WEIGHT: 151 LBS | BODY MASS INDEX: 26 KG/M2

## 2025-07-15 DIAGNOSIS — E87.1 HYPONATREMIA: Primary | ICD-10-CM

## 2025-07-15 DIAGNOSIS — N91.1 SECONDARY AMENORRHEA: ICD-10-CM

## 2025-07-15 DIAGNOSIS — H91.93 HEARING DEFICIT, BILATERAL: ICD-10-CM

## 2025-07-15 DIAGNOSIS — G40.909 SEIZURE DISORDER (HCC): ICD-10-CM

## 2025-07-15 DIAGNOSIS — E78.00 PURE HYPERCHOLESTEROLEMIA: ICD-10-CM

## 2025-07-15 DIAGNOSIS — F84.0 AUTISM (HCC): ICD-10-CM

## 2025-07-15 PROCEDURE — 99214 OFFICE O/P EST MOD 30 MIN: CPT | Performed by: FAMILY MEDICINE

## 2025-07-15 NOTE — PROGRESS NOTES
Chief Complaint:  Chief Complaint   Patient presents with    Medication Follow-Up     Medication follow up       HPI:  This is a 40 year old female patient presenting for Medication Follow-Up (Medication follow up)    History of Present Illness  Sejal Benitez is a 40 year old female with autism, developmental delay, and seizure disorder who presents for follow-up on her menstrual irregularities and other chronic conditions.    She has a history of autism and developmental delay with cognitive impairment. She is hearing impaired but can read and utilize sign language. A  is used for the visit.    She has a history of hyponatremia, which is stable with the most recent sodium level at 127, likely due to her medications. She also has a history of seizure disorder and is on carbamazepine, following with neurology. No recent seizures or changes in her medication regimen.    She was previously on oral contraceptive pills, but these have been discontinued. She has not had a menstrual period for several months. Recent lab work from April 16, 2025, showed an estradiol level of 303, FSH of 5.6, and LH of 9.3. Her TSH was normal at 1.78. An ultrasound was performed. Her prolactin level was noted to be high.    She is currently on Lexapro and risperidone for psychiatric management and follows with psychiatry. There is no mention of any recent changes in her psychiatric symptoms or medication regimen.    Her last labs also showed mild hyperlipidemia with an LDL of 118. There is no mention of any specific treatment for this condition.    Health Maintenance:  Health Maintenance   Topic Date Due    DTaP,Tdap,and Td Vaccines (1 - Tdap) Never done    Mammogram  07/17/2024    COVID-19 Vaccine (5 - 2024-25 season) 09/01/2024    Influenza Vaccine (1) 10/01/2025    Annual Physical  01/30/2026    Pap Smear  04/10/2027    Annual Depression Screening  Completed    Pneumococcal Vaccine: Birth to 50yrs  Aged Out     Meningococcal B Vaccine  Aged Out       ROS: Review of systems performed and negative unless stated in HPI.    Past medical, family and social history reviewed and listed as below.    HISTORY:  Past Medical History[1]   Past Surgical History[2]   Family History[3]   Short Social Hx on File[4]     Current Medications[5]  Allergies:  Allergies[6]    EXAM:  Vitals:    07/15/25 1008   BP: 100/68   Pulse: 84   Resp: 16   SpO2: 99%   Weight: 151 lb (68.5 kg)     GENERAL: vitals reviewed and listed above, alert, oriented, appears well hydrated and in no acute distress  HEENT: atraumatic, conjunctiva clear, no obvious abnormalities on inspection   LUNGS: clear to auscultation bilaterally, no wheezes, rales or rhonchi, good air movement  CV: HRRR, no murmurs, no peripheral edema   EXTREMITIES: warm and well perfused  PSYCH: pleasant and cooperative, no obvious depression or anxiety    ASSESSMENT AND PLAN:  Discussed the following assessment   1. Hyponatremia (Primary)  -     Basic Metabolic Panel (8); Future; Expected date: 07/15/2025  2. Seizure disorder (HCC)  3. Pure hypercholesterolemia  4. Autism (HCC)  5. Hearing deficit, bilateral  6. Secondary amenorrhea      Advised the following:  Assessment & Plan  Secondary amenorrhea  Amenorrhea for several months with recent labs indicating she is not in menopause. Pelvic ultrasound unremarkable. Elevated prolactin level likely due to risperidone, which can inhibit menstruation. Monitoring is advised as risperidone is known to increase prolactin levels, and medication change may not be necessary if she remains stable.  - Monitor menstrual status and prolactin levels  - Contact psychiatrist to discuss potential medication adjustments if necessary    Hyponatremia  Hyponatremia with current sodium level at 127 mEq/L, likely related to medication use. Regular monitoring is advised to ensure stability. Has been stable.   - Recheck sodium levels in two months    Seizure  disorder  Seizure disorder well-managed with carbamazepine and neurology follow-up. No recent seizures reported.    Recording duration: 12 minutes    Concerning signs and symptoms that warrant returning to the clinic reviewed and patient demonstrated understanding. RTC in 6 months.     Margarita Hanykonstantin   7/15/2025 10:33 AM  Family Medicine    Note to Patient  The 21st Century Cures Act makes medical notes like these available to patients in the interest of transparency. However, be advised this is a medical document and is intended as [id number] communication; it is written in medical language and may appear blunt, direct, or contain abbreviations or verbiage that are unfamiliar. Medical documents are intended to carry relevant information, facts as evident, and the clinical opinion of the practitioner.     This report has been produced using speech recognition software and [employer] technology, and may contain errors related to grammar, punctuation, spelling, words or phrases unrecognized or not translated appropriately to text; these errors may be referred to the dictating provider for further clarification and/or addendum as needed.         [1]   Past Medical History:   Autism (HCC)    Deaf    Mild mental retardation    Seizure disorder (HCC)   [2] History reviewed. No pertinent surgical history.  [3] History reviewed. No pertinent family history.  [4]   Social History  Socioeconomic History    Marital status: Single   Tobacco Use    Smoking status: Never    Smokeless tobacco: Never   Vaping Use    Vaping status: Never Used   Substance and Sexual Activity    Alcohol use: Not Currently    Drug use: Not Currently    Sexual activity: Not Currently   Other Topics Concern    Caffeine Concern Yes     Comment: pop daily    Exercise No     Comment: min   Social History Narrative    ** Merged History Encounter **        [5]   Current Outpatient Medications   Medication Sig Dispense Refill    CARBAMAZEPINE 200 MG Oral  Tab TAKE 1 TAB BY MOUTH THREE TIMES A DAY AT 7AM, 4PM & 8PM -SELF ADMINISTER 90 tablet 2    CETIRIZINE 10 MG Oral Tab TAKE 1 TAB BY MOUTH DAILY AT 8PM FOR ALLERGIES 30 tablet 10    ERGOCALCIFEROL 1.25 MG (82565 UT) Oral Cap TAKE ONE CAPSULE BY MOUTH ONCE WEEKLY ON WEDNESDAY AT 7AM (SELF ADMINISTER) 4 capsule 10    CYANOCOBALAMIN 1000 MCG Oral Tab TAKE 1 TAB BY MOUTH ONCE DAILY AT 7AM (SELF ADMINISTER) 30 tablet 10    GNP VITAMIN B-6 100 MG Oral Tab TAKE 3 TABS BY MOUTH TWICE A DAY AT 7AM & 8PM      Meloxicam 15 MG Oral Tab Take 1 tablet (15 mg total) by mouth daily as needed for Pain. 31 tablet 11    LORazepam 0.5 MG Oral Tab Take 1 tablet (0.5 mg total) by mouth every 4 (four) hours as needed for Anxiety.      guaiFENesin (ROBAFEN OR) Take by mouth.      Miconazole Nitrate (MONISTAT 1 DAY OR NIGHT) 1200 & 2 MG & % Vaginal Kit Place 1 each vaginally once.      fluticasone propionate 50 MCG/ACT Nasal Suspension 1 spray by Each Nare route daily. 3 each 3    risperiDONE 1 MG Oral Tab Take 1 tablet (1 mg total) by mouth 2 (two) times daily.      escitalopram 20 MG Oral Tab Take 1 tablet (20 mg total) by mouth every morning.      Acetaminophen 325 MG Oral Cap Take 325 tablets by mouth as needed. Pt taking 2 tablets (650mg) by mouth every 6 hours as needed for pain or fever      ibuprofen 400 MG Oral Tab Take 1 tablet (400 mg total) by mouth as needed.      Etodolac 400 MG Oral Tab Take 1 tablet (400 mg total) by mouth 2 (two) times daily.     [6] No Known Allergies

## 2025-07-15 NOTE — PROGRESS NOTES
The following individual(s) verbally consented to be recorded using ambient AI listening technology and understand that they can each withdraw their consent to this listening technology at any point by asking the clinician to turn off or pause the recording:    Patient name: Sejal KAVYA Benitez

## 2025-07-28 DIAGNOSIS — N30.01 ACUTE CYSTITIS WITH HEMATURIA: ICD-10-CM

## 2025-07-29 ENCOUNTER — HOSPITAL ENCOUNTER (OUTPATIENT)
Dept: MAMMOGRAPHY | Age: 40
Discharge: HOME | End: 2025-07-29
Attending: FAMILY MEDICINE

## 2025-07-29 ENCOUNTER — HOSPITAL ENCOUNTER (OUTPATIENT)
Dept: MAMMOGRAPHY | Age: 40
End: 2025-07-29
Attending: FAMILY MEDICINE

## 2025-07-29 DIAGNOSIS — Z12.31 VISIT FOR SCREENING MAMMOGRAM: ICD-10-CM

## 2025-07-29 PROCEDURE — 77063 BREAST TOMOSYNTHESIS BI: CPT | Performed by: FAMILY MEDICINE

## 2025-07-29 PROCEDURE — 77067 SCR MAMMO BI INCL CAD: CPT | Performed by: FAMILY MEDICINE

## 2025-07-29 RX ORDER — CARBAMAZEPINE 200 MG/1
TABLET ORAL
Qty: 90 TABLET | Refills: 2 | Status: SHIPPED | OUTPATIENT
Start: 2025-07-29

## 2025-08-21 ENCOUNTER — LAB ENCOUNTER (OUTPATIENT)
Dept: LAB | Age: 40
End: 2025-08-21
Attending: FAMILY MEDICINE

## 2025-08-21 DIAGNOSIS — E87.1 HYPONATREMIA: ICD-10-CM

## 2025-08-21 LAB
ANION GAP SERPL CALC-SCNC: 6 MMOL/L (ref 0–18)
BUN BLD-MCNC: 7 MG/DL (ref 9–23)
CALCIUM BLD-MCNC: 9.1 MG/DL (ref 8.7–10.6)
CHLORIDE SERPL-SCNC: 102 MMOL/L (ref 98–112)
CO2 SERPL-SCNC: 26 MMOL/L (ref 21–32)
CREAT BLD-MCNC: 0.61 MG/DL (ref 0.55–1.02)
EGFRCR SERPLBLD CKD-EPI 2021: 114 ML/MIN/1.73M2 (ref 60–?)
FASTING STATUS PATIENT QL REPORTED: YES
GLUCOSE BLD-MCNC: 88 MG/DL (ref 70–99)
OSMOLALITY SERPL CALC.SUM OF ELEC: 275 MOSM/KG (ref 275–295)
POTASSIUM SERPL-SCNC: 4.4 MMOL/L (ref 3.5–5.1)
SODIUM SERPL-SCNC: 134 MMOL/L (ref 136–145)

## 2025-08-21 PROCEDURE — 80048 BASIC METABOLIC PNL TOTAL CA: CPT

## 2025-08-21 PROCEDURE — 36415 COLL VENOUS BLD VENIPUNCTURE: CPT

## 2025-08-29 ENCOUNTER — RESULTS FOLLOW-UP (OUTPATIENT)
Dept: FAMILY MEDICINE CLINIC | Facility: CLINIC | Age: 40
End: 2025-08-29

## (undated) DIAGNOSIS — N30.01 ACUTE CYSTITIS WITH HEMATURIA: ICD-10-CM

## (undated) NOTE — ED AVS SNAPSHOT
Sotero Elliott   MRN: [id number]    Department:  [facility] Emergency Department   Date of Visit:  7/23/2018           Disclosure     Insurance plans vary and the physician(s) referred by the ER may not be covered by your plan.  Please contac tell this physician (or your personal doctor if your instructions are to return to your personal doctor) about any new or lasting problems. The primary care or specialist physician will see patients referred from the [facility] Emergency Department.  Wing Elliott

## (undated) NOTE — LETTER
Date: 11/15/2022    Patient Name: Jumana Sanabria          To Whom it may concern:    Megan Brar is currently a patient at my office who, because of Sejal's diagnosed developmental disability, is experiencing ongoing functional skill deficits. As a result of Sejal's current level of functioning, 26 hours of OT services would be insufficient in promoting any meaningful, long-term skill development. In this regard, it would be in Sejal's best interest to receive additional hours, up to 52, of skilled OT services. With the additional hours of therapeutic support, it is expected that [address] [place] [address] will achieve greater functional improvement, leading to increased independence and/or a decreased need for additional support services over time.      Sincerely,        Primus Moore, DO